# Patient Record
Sex: FEMALE | Race: WHITE | Employment: FULL TIME | URBAN - METROPOLITAN AREA
[De-identification: names, ages, dates, MRNs, and addresses within clinical notes are randomized per-mention and may not be internally consistent; named-entity substitution may affect disease eponyms.]

---

## 2018-11-05 PROBLEM — Z23 ENCOUNTER FOR IMMUNIZATION: Status: ACTIVE | Noted: 2018-11-05

## 2018-11-05 PROBLEM — Z34.90 PREGNANCY: Status: ACTIVE | Noted: 2018-11-05

## 2018-11-07 PROBLEM — Z67.91 RH NEGATIVE STATUS DURING PREGNANCY: Status: ACTIVE | Noted: 2018-11-07

## 2018-11-07 PROBLEM — O26.899 RH NEGATIVE STATUS DURING PREGNANCY: Status: ACTIVE | Noted: 2018-11-07

## 2018-11-07 PROBLEM — O09.899 MATERNAL VARICELLA, NON-IMMUNE: Status: ACTIVE | Noted: 2018-11-07

## 2018-11-07 PROBLEM — Z28.39 MATERNAL VARICELLA, NON-IMMUNE: Status: ACTIVE | Noted: 2018-11-07

## 2019-06-16 ENCOUNTER — ANESTHESIA EVENT (OUTPATIENT)
Dept: LABOR AND DELIVERY | Age: 29
DRG: 560 | End: 2019-06-16
Payer: COMMERCIAL

## 2019-06-16 ENCOUNTER — ANESTHESIA (OUTPATIENT)
Dept: LABOR AND DELIVERY | Age: 29
DRG: 560 | End: 2019-06-16
Payer: COMMERCIAL

## 2019-06-16 ENCOUNTER — HOSPITAL ENCOUNTER (INPATIENT)
Age: 29
LOS: 2 days | Discharge: HOME | DRG: 560 | End: 2019-06-18
Attending: OBSTETRICS & GYNECOLOGY | Admitting: OBSTETRICS & GYNECOLOGY
Payer: COMMERCIAL

## 2019-06-16 DIAGNOSIS — Z3A.40 40 WEEKS GESTATION OF PREGNANCY: Primary | ICD-10-CM

## 2019-06-16 PROBLEM — O42.00 PROM WITH ONSET OF LABOR WITHIN 24 HOURS OF RUPTURE: Status: ACTIVE | Noted: 2019-06-16

## 2019-06-16 LAB
ABO + RH BLD: NORMAL
ARTERIAL PATENCY WRIST A: ABNORMAL
BASE DEFICIT BLD-SCNC: 6 MMOL/L
BDY SITE: ABNORMAL
BLOOD BANK CMNT PATIENT-IMP: NORMAL
BLOOD GROUP ANTIBODIES SERPL: NORMAL
BODY TEMPERATURE: 98.6
CO2 BLD-SCNC: 27 MMOL/L
COLLECT TIME,HTIME: 1119
ERYTHROCYTE [DISTWIDTH] IN BLOOD BY AUTOMATED COUNT: 12.3 % (ref 11.9–14.6)
FETAL SCREEN,FMHS: NORMAL
GAS FLOW.O2 O2 DELIVERY SYS: ABNORMAL L/MIN
HCO3 BLD-SCNC: 24.8 MMOL/L (ref 22–26)
HCT VFR BLD AUTO: 35.6 % (ref 35.8–46.3)
HGB BLD-MCNC: 12.3 G/DL (ref 11.7–15.4)
MCH RBC QN AUTO: 31 PG (ref 26.1–32.9)
MCHC RBC AUTO-ENTMCNC: 34.6 G/DL (ref 31.4–35)
MCV RBC AUTO: 89.7 FL (ref 79.6–97.8)
NRBC # BLD: 0 K/UL (ref 0–0.2)
O2/TOTAL GAS SETTING VFR VENT: 21 %
PCO2 BLDCO: 73 MMHG (ref 32–68)
PH BLDCO: 7.14 [PH] (ref 7.15–7.38)
PLATELET # BLD AUTO: 231 K/UL (ref 150–450)
PMV BLD AUTO: 10.6 FL (ref 9.4–12.3)
PO2 BLDCO: 23 MMHG
RBC # BLD AUTO: 3.97 M/UL (ref 4.05–5.2)
SAO2 % BLD: 26 % (ref 95–98)
SERVICE CMNT-IMP: ABNORMAL
SPECIMEN EXP DATE BLD: NORMAL
SPECIMEN TYPE: ABNORMAL
WBC # BLD AUTO: 13.7 K/UL (ref 4.3–11.1)

## 2019-06-16 PROCEDURE — 76060000078 HC EPIDURAL ANESTHESIA

## 2019-06-16 PROCEDURE — 75410000003 HC RECOV DEL/VAG/CSECN EA 0.5 HR

## 2019-06-16 PROCEDURE — 85027 COMPLETE CBC AUTOMATED: CPT

## 2019-06-16 PROCEDURE — 74011250636 HC RX REV CODE- 250/636: Performed by: OBSTETRICS & GYNECOLOGY

## 2019-06-16 PROCEDURE — 36415 COLL VENOUS BLD VENIPUNCTURE: CPT

## 2019-06-16 PROCEDURE — 77030003028 HC SUT VCRL J&J -A

## 2019-06-16 PROCEDURE — 99283 EMERGENCY DEPT VISIT LOW MDM: CPT

## 2019-06-16 PROCEDURE — 85461 HEMOGLOBIN FETAL: CPT

## 2019-06-16 PROCEDURE — 59025 FETAL NON-STRESS TEST: CPT

## 2019-06-16 PROCEDURE — 77030014125 HC TY EPDRL BBMI -B: Performed by: ANESTHESIOLOGY

## 2019-06-16 PROCEDURE — 77030011943

## 2019-06-16 PROCEDURE — 74011250636 HC RX REV CODE- 250/636

## 2019-06-16 PROCEDURE — 0KQM0ZZ REPAIR PERINEUM MUSCLE, OPEN APPROACH: ICD-10-PCS | Performed by: OBSTETRICS & GYNECOLOGY

## 2019-06-16 PROCEDURE — 77030011945 HC CATH URIN INT ST MENT -A

## 2019-06-16 PROCEDURE — 74011000250 HC RX REV CODE- 250

## 2019-06-16 PROCEDURE — 75410000000 HC DELIVERY VAGINAL/SINGLE

## 2019-06-16 PROCEDURE — A4300 CATH IMPL VASC ACCESS PORTAL: HCPCS | Performed by: ANESTHESIOLOGY

## 2019-06-16 PROCEDURE — 86900 BLOOD TYPING SEROLOGIC ABO: CPT

## 2019-06-16 PROCEDURE — 74011250637 HC RX REV CODE- 250/637: Performed by: OBSTETRICS & GYNECOLOGY

## 2019-06-16 PROCEDURE — 82803 BLOOD GASES ANY COMBINATION: CPT

## 2019-06-16 PROCEDURE — 65270000029 HC RM PRIVATE

## 2019-06-16 PROCEDURE — 4A1HXCZ MONITORING OF PRODUCTS OF CONCEPTION, CARDIAC RATE, EXTERNAL APPROACH: ICD-10-PCS | Performed by: OBSTETRICS & GYNECOLOGY

## 2019-06-16 PROCEDURE — 77030018846 HC SOL IRR STRL H20 ICUM -A

## 2019-06-16 RX ORDER — ROPIVACAINE HYDROCHLORIDE 2 MG/ML
INJECTION, SOLUTION EPIDURAL; INFILTRATION; PERINEURAL AS NEEDED
Status: DISCONTINUED | OUTPATIENT
Start: 2019-06-16 | End: 2019-06-16

## 2019-06-16 RX ORDER — NALOXONE HYDROCHLORIDE 0.4 MG/ML
0.4 INJECTION, SOLUTION INTRAMUSCULAR; INTRAVENOUS; SUBCUTANEOUS AS NEEDED
Status: DISCONTINUED | OUTPATIENT
Start: 2019-06-16 | End: 2019-06-18 | Stop reason: HOSPADM

## 2019-06-16 RX ORDER — DOCUSATE SODIUM 100 MG/1
100 CAPSULE, LIQUID FILLED ORAL 2 TIMES DAILY
Status: DISCONTINUED | OUTPATIENT
Start: 2019-06-16 | End: 2019-06-18 | Stop reason: HOSPADM

## 2019-06-16 RX ORDER — BUTORPHANOL TARTRATE 1 MG/ML
1 INJECTION INTRAMUSCULAR; INTRAVENOUS
Status: DISCONTINUED | OUTPATIENT
Start: 2019-06-16 | End: 2019-06-16 | Stop reason: CLARIF

## 2019-06-16 RX ORDER — PROMETHAZINE HYDROCHLORIDE 25 MG/1
25 TABLET ORAL
Status: DISCONTINUED | OUTPATIENT
Start: 2019-06-16 | End: 2019-06-18 | Stop reason: HOSPADM

## 2019-06-16 RX ORDER — ROPIVACAINE HYDROCHLORIDE 2 MG/ML
INJECTION, SOLUTION EPIDURAL; INFILTRATION; PERINEURAL
Status: DISCONTINUED | OUTPATIENT
Start: 2019-06-16 | End: 2019-06-16 | Stop reason: HOSPADM

## 2019-06-16 RX ORDER — OXYTOCIN/RINGER'S LACTATE 30/500 ML
1-25 PLASTIC BAG, INJECTION (ML) INTRAVENOUS
Status: DISCONTINUED | OUTPATIENT
Start: 2019-06-16 | End: 2019-06-18 | Stop reason: ALTCHOICE

## 2019-06-16 RX ORDER — SIMETHICONE 80 MG
80 TABLET,CHEWABLE ORAL
Status: DISCONTINUED | OUTPATIENT
Start: 2019-06-16 | End: 2019-06-18 | Stop reason: HOSPADM

## 2019-06-16 RX ORDER — ZOLPIDEM TARTRATE 5 MG/1
5 TABLET ORAL
Status: DISCONTINUED | OUTPATIENT
Start: 2019-06-16 | End: 2019-06-18 | Stop reason: HOSPADM

## 2019-06-16 RX ORDER — MINERAL OIL
120 OIL (ML) ORAL
Status: COMPLETED | OUTPATIENT
Start: 2019-06-16 | End: 2019-06-16

## 2019-06-16 RX ORDER — OXYTOCIN/RINGER'S LACTATE 15/250 ML
250 PLASTIC BAG, INJECTION (ML) INTRAVENOUS ONCE
Status: ACTIVE | OUTPATIENT
Start: 2019-06-16 | End: 2019-06-16

## 2019-06-16 RX ORDER — IBUPROFEN 800 MG/1
800 TABLET ORAL
Status: DISCONTINUED | OUTPATIENT
Start: 2019-06-16 | End: 2019-06-18 | Stop reason: HOSPADM

## 2019-06-16 RX ORDER — OXYCODONE AND ACETAMINOPHEN 7.5; 325 MG/1; MG/1
1 TABLET ORAL
Status: DISCONTINUED | OUTPATIENT
Start: 2019-06-16 | End: 2019-06-18 | Stop reason: HOSPADM

## 2019-06-16 RX ORDER — FENTANYL CITRATE 50 UG/ML
INJECTION, SOLUTION INTRAMUSCULAR; INTRAVENOUS AS NEEDED
Status: DISCONTINUED | OUTPATIENT
Start: 2019-06-16 | End: 2019-06-16 | Stop reason: HOSPADM

## 2019-06-16 RX ORDER — LIDOCAINE HYDROCHLORIDE 20 MG/ML
JELLY TOPICAL
Status: DISCONTINUED | OUTPATIENT
Start: 2019-06-16 | End: 2019-06-16 | Stop reason: HOSPADM

## 2019-06-16 RX ORDER — BUTORPHANOL TARTRATE 2 MG/ML
1 INJECTION INTRAMUSCULAR; INTRAVENOUS
Status: DISCONTINUED | OUTPATIENT
Start: 2019-06-16 | End: 2019-06-16 | Stop reason: HOSPADM

## 2019-06-16 RX ORDER — ONDANSETRON 2 MG/ML
4 INJECTION INTRAMUSCULAR; INTRAVENOUS
Status: DISCONTINUED | OUTPATIENT
Start: 2019-06-16 | End: 2019-06-18 | Stop reason: ALTCHOICE

## 2019-06-16 RX ORDER — SODIUM CHLORIDE 0.9 % (FLUSH) 0.9 %
5-40 SYRINGE (ML) INJECTION AS NEEDED
Status: DISCONTINUED | OUTPATIENT
Start: 2019-06-16 | End: 2019-06-16 | Stop reason: HOSPADM

## 2019-06-16 RX ORDER — LIDOCAINE HYDROCHLORIDE 10 MG/ML
1 INJECTION INFILTRATION; PERINEURAL
Status: COMPLETED | OUTPATIENT
Start: 2019-06-16 | End: 2019-06-16

## 2019-06-16 RX ORDER — LIDOCAINE HYDROCHLORIDE AND EPINEPHRINE 15; 5 MG/ML; UG/ML
INJECTION, SOLUTION EPIDURAL
Status: COMPLETED | OUTPATIENT
Start: 2019-06-16 | End: 2019-06-16

## 2019-06-16 RX ORDER — ACETAMINOPHEN 500 MG
500 TABLET ORAL
COMMUNITY
End: 2020-12-09

## 2019-06-16 RX ORDER — DEXTROSE, SODIUM CHLORIDE, SODIUM LACTATE, POTASSIUM CHLORIDE, AND CALCIUM CHLORIDE 5; .6; .31; .03; .02 G/100ML; G/100ML; G/100ML; G/100ML; G/100ML
125 INJECTION, SOLUTION INTRAVENOUS CONTINUOUS
Status: DISCONTINUED | OUTPATIENT
Start: 2019-06-16 | End: 2019-06-16 | Stop reason: HOSPADM

## 2019-06-16 RX ORDER — DIPHENHYDRAMINE HCL 25 MG
25 CAPSULE ORAL
Status: DISCONTINUED | OUTPATIENT
Start: 2019-06-16 | End: 2019-06-18 | Stop reason: HOSPADM

## 2019-06-16 RX ORDER — MINERAL OIL
120 OIL (ML) ORAL AS NEEDED
Status: DISCONTINUED | OUTPATIENT
Start: 2019-06-16 | End: 2019-06-18 | Stop reason: ALTCHOICE

## 2019-06-16 RX ORDER — ONDANSETRON 2 MG/ML
INJECTION INTRAMUSCULAR; INTRAVENOUS
Status: ACTIVE
Start: 2019-06-16 | End: 2019-06-16

## 2019-06-16 RX ORDER — SODIUM CHLORIDE 0.9 % (FLUSH) 0.9 %
5-40 SYRINGE (ML) INJECTION EVERY 8 HOURS
Status: DISCONTINUED | OUTPATIENT
Start: 2019-06-16 | End: 2019-06-16 | Stop reason: HOSPADM

## 2019-06-16 RX ADMIN — SODIUM CHLORIDE, SODIUM LACTATE, POTASSIUM CHLORIDE, CALCIUM CHLORIDE, AND DEXTROSE MONOHYDRATE 125 ML/HR: 600; 310; 30; 20; 5 INJECTION, SOLUTION INTRAVENOUS at 03:50

## 2019-06-16 RX ADMIN — Medication 1 SPRAY: at 16:16

## 2019-06-16 RX ADMIN — OXYTOCIN 2 MILLI-UNITS/MIN: 10 INJECTION, SOLUTION INTRAMUSCULAR; INTRAVENOUS at 06:04

## 2019-06-16 RX ADMIN — SODIUM CHLORIDE, SODIUM LACTATE, POTASSIUM CHLORIDE, AND CALCIUM CHLORIDE 500 ML: 600; 310; 30; 20 INJECTION, SOLUTION INTRAVENOUS at 10:12

## 2019-06-16 RX ADMIN — OXYCODONE HYDROCHLORIDE AND ACETAMINOPHEN 1 TABLET: 7.5; 325 TABLET ORAL at 13:48

## 2019-06-16 RX ADMIN — WITCH HAZEL 1 PAD: 500 SOLUTION RECTAL; TOPICAL at 15:15

## 2019-06-16 RX ADMIN — DOCUSATE SODIUM 100 MG: 100 CAPSULE, LIQUID FILLED ORAL at 16:16

## 2019-06-16 RX ADMIN — ROPIVACAINE HYDROCHLORIDE 9 ML/HR: 2 INJECTION, SOLUTION EPIDURAL; INFILTRATION; PERINEURAL at 03:47

## 2019-06-16 RX ADMIN — OXYTOCIN 4 MILLI-UNITS/MIN: 10 INJECTION, SOLUTION INTRAMUSCULAR; INTRAVENOUS at 07:02

## 2019-06-16 RX ADMIN — OXYCODONE HYDROCHLORIDE AND ACETAMINOPHEN 1 TABLET: 7.5; 325 TABLET ORAL at 22:09

## 2019-06-16 RX ADMIN — IBUPROFEN 800 MG: 800 TABLET, FILM COATED ORAL at 13:08

## 2019-06-16 RX ADMIN — IBUPROFEN 800 MG: 800 TABLET, FILM COATED ORAL at 19:51

## 2019-06-16 RX ADMIN — OXYCODONE HYDROCHLORIDE AND ACETAMINOPHEN 1 TABLET: 7.5; 325 TABLET ORAL at 17:37

## 2019-06-16 RX ADMIN — LIDOCAINE HYDROCHLORIDE 0.1 ML: 10 INJECTION, SOLUTION INFILTRATION; PERINEURAL at 11:38

## 2019-06-16 RX ADMIN — ONDANSETRON 4 MG: 2 INJECTION INTRAMUSCULAR; INTRAVENOUS at 02:15

## 2019-06-16 RX ADMIN — LIDOCAINE HYDROCHLORIDE AND EPINEPHRINE 3.5 ML: 15; 5 INJECTION, SOLUTION EPIDURAL at 03:42

## 2019-06-16 RX ADMIN — OXYTOCIN 250 MILLI-UNITS/MIN: 10 INJECTION, SOLUTION INTRAMUSCULAR; INTRAVENOUS at 11:42

## 2019-06-16 RX ADMIN — FENTANYL CITRATE 100 MCG: 50 INJECTION, SOLUTION INTRAMUSCULAR; INTRAVENOUS at 08:36

## 2019-06-16 RX ADMIN — MINERAL OIL 120 ML: 471.95 OIL ORAL at 10:12

## 2019-06-16 NOTE — PROGRESS NOTES
Pt c/o urge to void. Pt up and ambulated to bathroom with assistance from this RN and MAMADOU Perdomo RN. Pt able to void 540ml, no clots noted in toilet, christiano care taught and pt able to perform. Christiano pad changed, mesh underwear changed. Ice pack applied to perineum. Upon standing up from toilet, pt c/o feeling lightheaded, amnioma pack opened, pt alert, talking, pink. Pt able to walk back to bed, HOB lowered. Pt in bed with call light in reach, no complaints at this time.

## 2019-06-16 NOTE — H&P
History & Physical    Name: Beata Reyes MRN: [id number]  SSN: [id number]    YOB: 1990  Age: 34 y.o. Sex: female      Chief c/o: srom  Subjective:     Estimated Date of Delivery: 19  OB History    Para Term  AB Living   1 0 0 0 0 0   SAB TAB Ectopic Molar Multiple Live Births   0 0 0   0        # Outcome Date GA Lbr Harry/2nd Weight Sex Delivery Anes PTL Lv   1 Current                Ms. Carlos Patterson is admitted with pregnancy at [address] [place] [address] for prom. Prenatal course was complicated by marginal cord insertion. Please see prenatal records for details. No past medical history on file. No past surgical history on file. Social History     Occupational History    Not on file   Tobacco Use    Smoking status: Never Smoker    Smokeless tobacco: Never Used   Substance and Sexual Activity    Alcohol use: No    Drug use: No    Sexual activity: Yes     Partners: Male     Family History   Problem Relation Age of Onset    No Known Problems Mother     No Known Problems Father        Allergies   Allergen Reactions    Sulfa (Sulfonamide Antibiotics) Unknown (comments)     As a child     Prior to Admission medications    Medication Sig Start Date End Date Taking? Authorizing Provider   acetaminophen (TYLENOL EXTRA STRENGTH) 500 mg tablet Take 500 mg by mouth every six (6) hours as needed for Pain. Yes Provider, Historical   raNITIdine (ZANTAC) 150 mg tablet Take 150 mg by mouth two (2) times a day. Yes Provider, Historical   [id number] nadeem-folic-dha (PRENATAL DHA+COMPLETE PRENATAL) [id number] mg-mcg-mg cmpk Take  by mouth. Yes Provider, Historical        Review of Systems: A comprehensive review of systems was negative except for that written in the HPI.     Objective:     Vitals:  Vitals:    19 0017 19 0019   BP:  117/85   Pulse:  95   Resp:  16   Temp:  97.8 °F (36.6 °C)   Weight: 86.2 kg (190 lb)    Height: 5' 5\" (1.651 m)         Physical Exam:  Patient without distress. Heart: Regular rate and rhythm  Lung: clear to auscultation throughout lung fields, no wheezes, no rales, no rhonchi and normal respiratory effort  Back: costovertebral angle tenderness absent  Abdomen: soft, nontender  Fundus: soft and non tender  Perineum: blood absent, amniotic fluid present  Cervical Exam: 3 cm dilated    90% effaced    0 station    Presenting Part: cephalic  Lower Extremities:  - Edema 1+   - Patellar Reflexes: 2+ bilaterally  Membranes:  Spontaneous Rupture of Membranes; Amniotic Fluid: thin meconium fluid  Fetal Heart Rate: Reactive    Prenatal Labs:   Lab Results   Component Value Date/Time    Rubella, External immune 11/05/2018    GrBStrep, External negative 05/17/2019    HBsAg, External negative 11/05/2018    HIV, External NR 11/05/2018    RPR, External NR 11/05/2018    Gonorrhea, External negative 12/03/2018    Chlamydia, External negative 12/03/2018    ABO,Rh AB negative 11/05/2018         Assessment/Plan:     Active Problems:    PROM with onset of labor within 24 hours of rupture (6/16/2019)         Plan:29 yo G1 at [address] [place] [address] for PROM. Light meconium. Mild contractions. Group B Strep was negative. Rh - neg. Having mild contractions. Start pitocin if no contractions by 6:00.

## 2019-06-16 NOTE — PROGRESS NOTES
Dr. Ang Olivares @ bedside. E 3/90/0. Bedside US performed to verify cephalic position. Pt. Grossly ruptures with thin meconium stained fluid. Will admit to room 428 for SAMUEL. 0115 IV started and blood drawn and sent to lab. Consents reviewed and POC discussed. Pt voiced understanding and denies questions at this time.

## 2019-06-16 NOTE — ANESTHESIA PREPROCEDURE EVALUATION
Relevant Problems   No relevant active problems       Anesthetic History   No history of anesthetic complications            Review of Systems / Medical History  Patient summary reviewed, nursing notes reviewed and pertinent labs reviewed    Pulmonary  Within defined limits                 Neuro/Psych   Within defined limits           Cardiovascular                  Exercise tolerance: >4 METS     GI/Hepatic/Renal     GERD           Endo/Other  Within defined limits           Other Findings              Physical Exam    Airway  Mallampati: II  TM Distance: 4 - 6 cm  Neck ROM: normal range of motion   Mouth opening: Normal     Cardiovascular    Rhythm: regular           Dental  No notable dental hx       Pulmonary  Breath sounds clear to auscultation               Abdominal  GI exam deferred       Other Findings            Anesthetic Plan    ASA: 2  Anesthesia type: epidural          Induction: Intravenous  Anesthetic plan and risks discussed with: Patient

## 2019-06-16 NOTE — PROCEDURES
Delivery Note    Patient Name: Viral Matos  MRN: [id number]    Obstetrician:  Marko Hines MD    Assistant: none    Pre-Delivery Diagnosis: Term pregnancy, Spontaneous labor, Single fetus and Uncomplicated pregnancy    Post-Delivery Diagnosis: Female    Intrapartum Event: Meconium    Procedure: Spontaneous vaginal delivery    Epidural: YES    Monitor:  Fetal Heart Tones - External and Uterine Contractions - External    Indications for instrumental delivery: none    Estimated Blood Loss: 300    Episiotomy: 2nd degree    Laceration(s):  2nd degree, large extension of small epis, almost a 3rd.     Laceration(s) repair: YES    Presentation: Cephalic    Fetal Description: del castillo    Fetal Position: Left Occiput Anterior    Birth Weight: 7-1    Birth Length: 51    Apgar - One Minute: 9    Apgar - Five Minutes: 9    Umbilical Cord: 3 vessels present and Cord blood sent to lab for type, Rh, and Ad' test    Specimens: no           Complications:  none           No components found for: OBEXTABO/RH,  OBEXTANTIBODY,  OBEXTRUBELLA,  OBEXTGRBS         Attending Attestation: I was present and scrubbed for the entire procedure

## 2019-06-16 NOTE — ANESTHESIA PROCEDURE NOTES
Epidural Block    Start time: 6/16/2019 3:30 AM  End time: 6/16/2019 3:45 AM  Performed by: Trini Denise MD  Authorized by: Trini Denise MD     Pre-Procedure  Indication: labor epidural    Preanesthetic Checklist: patient identified, risks and benefits discussed, anesthesia consent, site marked, patient being monitored, timeout performed and anesthesia consent    Timeout Time: 03:35        Epidural:   Patient position:  Seated  Prep: Chlorhexidine    Location:  L3-4    Needle and Epidural Catheter:   Needle Type:  Tuohy  Needle Gauge:  17 G  Injection Technique:  Loss of resistance using air  Attempts:  1  Catheter Size:  19 G  Catheter at Skin Depth (cm):  12  Depth in Epidural Space (cm):  6  Events: no blood with aspiration, no cerebrospinal fluid with aspiration, no paresthesia and negative aspiration test    Test Dose:  Negative    Assessment:   Catheter Secured:  Tegaderm and tape  Insertion:  Uncomplicated  Patient tolerance:  Patient tolerated the procedure well with no immediate complications

## 2019-06-16 NOTE — PROGRESS NOTES
Pt up and ambulated to bathroom with no complaints. Pt able to void again. No clots noted in toilet. Alessia care performed by pt. Alessia pad changed. Pt back in bed with call light in reach. Pt tolerated ambulation well.

## 2019-06-16 NOTE — PROGRESS NOTES
SBAR OUT Report: Mother    Verbal report given to Vijay Walls RN  (full name & credentials) on this patient, who is now being transferred to MIU (unit) for routine progression of care. The patient is not wearing a green \"Anesthesia-Duramorph\" band. Report consisted of patient's Situation, Background, Assessment and Recommendations (SBAR). [place] ID bands were compared with the identification form, and verified with the patient and receiving nurse. Information from the SBAR and the [address] [facility] Report was reviewed with the receiving nurse; opportunity for questions and clarification provided.

## 2019-06-16 NOTE — PROGRESS NOTES
Pt in room 428. SBAR given to Ra Villanueva RN. Pt care relinquished. Pt not hooked up to EFM. Requests wireless monitoring. Raul Man is placing [place] wireless monitor at this time.

## 2019-06-16 NOTE — PROGRESS NOTES
Upon entering room pt sitting on side of bed crying with FOB (Wing Caller) at bedside. Pt was talking on the phone. Once off the phone this RN expressed concern for pt, pt states she was \"tired and needs to rest\". Pt states \"I need time with my new baby\". Pt states FOB made plans for his family to come meet the baby tonight and she doesn't want anymore visitors at this time. Pt states she feels bad for not letting his family come when she let her mother, father and sister come. This RN sat on bed with pt and listened and explained to pt that she does need to rest to help with her recovery and to help take care of baby. FOB states he will call family and ask them not to come. Both FOB and pt crying. This RN stayed at bedside until both were no longer crying. Upon leaving the room pt in bed holding baby, call light in reach. Social service consult ordered to follow up with pt regarding support system.

## 2019-06-16 NOTE — PROGRESS NOTES
Shift assessment complete. See flowsheet for details. POC reviewed with pt and pt verbalized understanding. Pt oriented to room and has call light within reach. Pt denies any needs at this time but will call out PRN. Pt now resting in bed with  at bedside. Encouraged pt to get some rest, will continue to monitor.

## 2019-06-16 NOTE — PROGRESS NOTES
Pt in bed crying, FOB at bedside holding pt hand. Pt denies needs at this time. Call light in reach.

## 2019-06-16 NOTE — LACTATION NOTE
This note was copied from a baby's chart. Mom reports baby attempted at delivery. Assisted with attempt in laid back on L and football on R. No latch. Baby uninterested and gaggy. She really did not try to latch. Reviewed first 24 hour expectations. Discussed feeding expectations in second day. Encouraged to try at breast.  Reviewed Breastfeeding Packet. Put baby skin to skin and encouraged to try again at breast.  Plan to assist with feeding prior to discharge. Encouraged skin to skin. Discussed if baby still struggling to latch by am, may need to pump.

## 2019-06-16 NOTE — PROGRESS NOTES
SBAR IN Report: Mother    Verbal report received from Konstantin Doan RN (full name & credentials) on this patient, who is now being transferred from L&D (unit) for routine progression of care. The patient is not wearing a green \"Anesthesia-Duramorph\" band. Report consisted of patient's Situation, Background, Assessment and Recommendations (SBAR).  ID bands were compared with the identification form, and verified with the patient and transferring nurse. Information from the SBAR, Procedure Summary, Intake/Output, MAR and Recent Results and the [place] Report was reviewed with the transferring nurse; opportunity for questions and clarification provided.

## 2019-06-16 NOTE — PROGRESS NOTES
Pt reports to [facility] with c/o SROM @ 2320 06/15/2019. Pt reports a gush of clear fluid. TOCO and US applied to soft non-tender abdomen. Triage database complete.  Dr. Kirstie Barnett notified of pt arrival.

## 2019-06-16 NOTE — LACTATION NOTE

## 2019-06-16 NOTE — PROGRESS NOTES
Admission assessment complete see flowsheet. Pt oriented to room and call light. Pt having some numbness/tingling in LLE, pt able to wiggle toes. Pt instructed to call for assistance before getting OOB. Discussed today plan of care with pt. Pt voiced understanding. Pt to call with needs/concerns. Pt in bed eating upon this RN leaving the room. Call light in reach, family at bedside, water pitcher filled.

## 2019-06-17 LAB
ARTERIAL PATENCY WRIST A: ABNORMAL
BASE DEFICIT BLD-SCNC: 6 MMOL/L
BDY SITE: ABNORMAL
BODY TEMPERATURE: 98.6
CO2 BLD-SCNC: 23 MMOL/L
COLLECT TIME,HTIME: 1119
GAS FLOW.O2 O2 DELIVERY SYS: ABNORMAL L/MIN
HCO3 BLDV-SCNC: 21.2 MMOL/L (ref 23–28)
O2/TOTAL GAS SETTING VFR VENT: 21 %
PCO2 BLDCO: 48 MMHG (ref 32–68)
PH BLDCO: 7.26 [PH] (ref 7.15–7.38)
PO2 BLDCO: 29 MMHG
SAO2 % BLDV: 46 % (ref 65–88)
SERVICE CMNT-IMP: ABNORMAL
SPECIMEN TYPE: ABNORMAL

## 2019-06-17 PROCEDURE — 74011250637 HC RX REV CODE- 250/637: Performed by: OBSTETRICS & GYNECOLOGY

## 2019-06-17 PROCEDURE — 74011250636 HC RX REV CODE- 250/636: Performed by: OBSTETRICS & GYNECOLOGY

## 2019-06-17 PROCEDURE — 65270000029 HC RM PRIVATE

## 2019-06-17 RX ADMIN — OXYCODONE HYDROCHLORIDE AND ACETAMINOPHEN 1 TABLET: 7.5; 325 TABLET ORAL at 10:45

## 2019-06-17 RX ADMIN — SIMETHICONE CHEW TAB 80 MG 80 MG: 80 TABLET ORAL at 08:36

## 2019-06-17 RX ADMIN — IBUPROFEN 800 MG: 800 TABLET, FILM COATED ORAL at 20:32

## 2019-06-17 RX ADMIN — OXYCODONE HYDROCHLORIDE AND ACETAMINOPHEN 1 TABLET: 7.5; 325 TABLET ORAL at 18:30

## 2019-06-17 RX ADMIN — OXYCODONE HYDROCHLORIDE AND ACETAMINOPHEN 1 TABLET: 7.5; 325 TABLET ORAL at 14:22

## 2019-06-17 RX ADMIN — RHO(D) IMMUNE GLOBULIN (HUMAN) 0.3 MG: 1500 SOLUTION INTRAMUSCULAR at 17:34

## 2019-06-17 RX ADMIN — IBUPROFEN 800 MG: 800 TABLET, FILM COATED ORAL at 08:36

## 2019-06-17 RX ADMIN — OXYCODONE HYDROCHLORIDE AND ACETAMINOPHEN 1 TABLET: 7.5; 325 TABLET ORAL at 22:24

## 2019-06-17 RX ADMIN — OXYCODONE HYDROCHLORIDE AND ACETAMINOPHEN 1 TABLET: 7.5; 325 TABLET ORAL at 05:12

## 2019-06-17 RX ADMIN — OXYCODONE HYDROCHLORIDE AND ACETAMINOPHEN 1 TABLET: 7.5; 325 TABLET ORAL at 01:39

## 2019-06-17 RX ADMIN — DOCUSATE SODIUM 100 MG: 100 CAPSULE, LIQUID FILLED ORAL at 08:36

## 2019-06-17 RX ADMIN — IBUPROFEN 800 MG: 800 TABLET, FILM COATED ORAL at 01:39

## 2019-06-17 RX ADMIN — DOCUSATE SODIUM 100 MG: 100 CAPSULE, LIQUID FILLED ORAL at 18:30

## 2019-06-17 RX ADMIN — IBUPROFEN 800 MG: 800 TABLET, FILM COATED ORAL at 14:22

## 2019-06-17 NOTE — PROGRESS NOTES
Shift assessment complete see flowsheet. Discussed today plan of care with pt. Encouraged pt to ambulate in hallway today. Motrin given for pain, see MAR, too soon to given Percocet and pt would like to eat breakfast before taking percocet. Mylicon given for c/o indigestion. Pt in bed with call light in reach.

## 2019-06-17 NOTE — PROGRESS NOTES
Post-Partum Day Number 1 Progress Note    Patient doing well post-partum without significant complaint. Voiding without difficulty, normal lochia. Vitals:    Patient Vitals for the past 8 hrs:   BP Temp Pulse Resp SpO2   19 0648 109/58 98.2 °F (36.8 °C) 92 17 96 %     Temp (24hrs), Av.3 °F (36.8 °C), Min:98 °F (36.7 °C), Max:98.5 °F (36.9 °C)      Vital signs stable, afebrile. Exam:  Patient without distress. Abdomen soft, fundus firm at level of umbilicus, nontender               Perineum with normal lochia noted. Lower extremities are negative for swelling, cords or tenderness. Lab/Data Review: All lab results for the last 24 hours reviewed. Assessment and Plan:  Patient appears to be having uncomplicated post-partum course. Continue routine perineal care and maternal education. Plan discharge tomorrow if no problems occur.

## 2019-06-17 NOTE — LACTATION NOTE
This note was copied from a baby's chart. Baby finished feeding on left breast, encouraged mother to go ahead and pump both breast especially the right due to the fact baby is only latching on left side and to give back what she pumps. Mother voiced understanding.

## 2019-06-17 NOTE — LACTATION NOTE
This note was copied from a baby's chart. Due to the fact that baby would not latch at last feeding this RN offered to get mother pumping. Mother declines to pump at this time and would like for her visitors to see baby quickly and then she may want to pump.

## 2019-06-17 NOTE — LACTATION NOTE

## 2019-06-17 NOTE — PROGRESS NOTES
Care Management Interventions  Current Support Network: Lives with Spouse  Discharge Location  Discharge Placement: Home  SW Consult \"See note from 6/16/19 at [phone] and 1818 and first-time parent\". Attempted to see patient on several occasions however could not as sign on door \"Do Not Disturb, Mother and baby sleeping\". Will leave note for SW tomorrow to attempt again.

## 2019-06-17 NOTE — LACTATION NOTE
In to see mom and infant for the first time. Mom stated that infant has latched and nursed but most of the feeds have just been attempts. Offered to assist with a feeding. Placed infant to mom's left breast in the football hold as well as the cross cradle. Infant latched and would take a few sucks and come off the breast. After several attempts I suggested to mom to initiate pumping. She agreed and I brought a breast pump into the room and instructed her on how to use the pump and the breast pump kit. Also informed mom that the kit was hers to take home. Mom pumped and expressed drops. Informed mom that this was normal and she feed that to infant on her finger. Instructed mom to offer breast, then pump and feed expressed colostrum to infant. Reviewed second night of life. Lactation consultant will follow up tomorrow.

## 2019-06-18 VITALS
HEIGHT: 65 IN | WEIGHT: 190 LBS | DIASTOLIC BLOOD PRESSURE: 66 MMHG | RESPIRATION RATE: 16 BRPM | TEMPERATURE: 98.5 F | HEART RATE: 74 BPM | SYSTOLIC BLOOD PRESSURE: 114 MMHG | BODY MASS INDEX: 31.65 KG/M2 | OXYGEN SATURATION: 95 %

## 2019-06-18 PROCEDURE — 74011250637 HC RX REV CODE- 250/637: Performed by: OBSTETRICS & GYNECOLOGY

## 2019-06-18 RX ORDER — IBUPROFEN 800 MG/1
800 TABLET ORAL
Qty: 30 TAB | Refills: 0 | Status: SHIPPED | OUTPATIENT
Start: 2019-06-18 | End: 2020-01-02

## 2019-06-18 RX ORDER — OXYCODONE AND ACETAMINOPHEN 7.5; 325 MG/1; MG/1
1 TABLET ORAL
Qty: 12 TAB | Refills: 0 | Status: SHIPPED | OUTPATIENT
Start: 2019-06-18 | End: 2019-06-21 | Stop reason: ALTCHOICE

## 2019-06-18 RX ADMIN — OXYCODONE HYDROCHLORIDE AND ACETAMINOPHEN 1 TABLET: 7.5; 325 TABLET ORAL at 05:56

## 2019-06-18 RX ADMIN — IBUPROFEN 800 MG: 800 TABLET, FILM COATED ORAL at 08:32

## 2019-06-18 RX ADMIN — OXYCODONE HYDROCHLORIDE AND ACETAMINOPHEN 1 TABLET: 7.5; 325 TABLET ORAL at 02:24

## 2019-06-18 RX ADMIN — DOCUSATE SODIUM 100 MG: 100 CAPSULE, LIQUID FILLED ORAL at 08:31

## 2019-06-18 RX ADMIN — IBUPROFEN 800 MG: 800 TABLET, FILM COATED ORAL at 02:24

## 2019-06-18 NOTE — LACTATION NOTE
This note was copied from a baby's chart. In to see mom and infant for discharge. Mom doing skin to skin w/ baby at this time. She states baby is starting to latch and feed a little longer- did 20 minutes on one side this am. Mom pumped other side. Mom feels baby is latching well, but many feeds very short (10 min only). Encouraged her to keep putting baby to breast first, offer both sides. Pump behind any poor/fair feeds or less than 15-20 minutes, and give back colostrum today. Also printed feeding plan for further guidance as needed at home. Mom [facility] tomorrow for follow up. Reviewed discharge info and how to manage period of engorgement. No further needs.

## 2019-06-18 NOTE — ANESTHESIA POSTPROCEDURE EVALUATION
* No procedures listed *.    epidural    Anesthesia Post Evaluation      Multimodal analgesia: multimodal analgesia used between 6 hours prior to anesthesia start to PACU discharge  Patient location during evaluation: bedside  Patient participation: complete - patient participated  Level of consciousness: awake and alert  Pain score: 3  Pain management: adequate  Airway patency: patent  Anesthetic complications: no  Cardiovascular status: acceptable and hemodynamically stable  Respiratory status: acceptable  Hydration status: acceptable  Post anesthesia nausea and vomiting:  none      No vitals data found for the desired time range.

## 2019-06-18 NOTE — PROGRESS NOTES
Discharge instructions completed. Patient voiced understanding. Prescription given.   Patient waiting on hearing screen for baby

## 2019-06-18 NOTE — DISCHARGE SUMMARY
Post-Partum Day Number 2 Progress/Discharge Note    Patient doing well post-partum without significant complaint. Voiding without difficulty, normal lochia, positive flatus. Vitals:    Patient Vitals for the past 8 hrs:   BP Temp Pulse Resp SpO2   19 0725 114/66 98.5 °F (36.9 °C) 74 16 95 %     Temp (24hrs), Av.2 °F (36.8 °C), Min:97.8 °F (36.6 °C), Max:98.5 °F (36.9 °C)      Vital signs stable, afebrile. Exam:  Patient without distress. Abdomen soft, fundus firm at level of umbilicus, non tender               Perineum with normal lochia noted. Lower extremities are negative for swelling, cords or tenderness. Lab/Data Review: All lab results for the last 24 hours reviewed. Assessment and Plan:  Patient appears to be having uncomplicated post-partum course. Continue routine perineal care and maternal education. Plan discharge for today with follow up in our office in 2 weeks.

## 2019-06-18 NOTE — LACTATION NOTE
This note was copied from a baby's chart. Individualized Feeding Plan for Breastfeeding   Lactation Services [phone]      As much as possible, hold your baby on your chest so babys bare skin is against your bare skin with a blanket covering babys back, especially 30 minutes before it is time for baby to eat. Watch for early feeding cues such as, licking lips, sucking motions, rooting, hands to mouth. Crying is a late feeding cue. Feed your baby at least 8 times in 24 hours, or more if your baby is showing feeding cues. If baby is sleepy put baby skin to skin and watch for hunger cues. To rouse baby: unwrap, undress, massage hands, feet, & back, change diaper, gently change babys position from lying to sitting. 15-20 minutes on the first breast of active breastfeeding is considered a good feeding. Good, active breastfeeding is when baby is alert, tugging the nipple, their ear may move, and you can hear swallows. Allow baby to finish the first side before changing sides. Sleeping at the breast or only brief, light sucks should not be considered a good, full breastfeed. At each feeding:  __x__1. Do Suck Practice on finger before each feeding until sucking pattern is smooth. Try using index finger. Nail down towards tongue. __x__2. Hand Express for a few minutes prior to latching to help start milk flow. __x__3. Baby needs to NURSE WELL x 15-20 minutes on at least first breast, burp and offer 2nd breast at every feeding. If no sustained latch only attempt at breast for 10 minutes. If baby does not latch on and feed well on at least one side, you should:   __x__4. Double pump for 15 minutes with breast massage and compression. Hand express for an additional 2-3 minutes per side. Pump after each feeding attempt or poor feeding, up to 8 times per day. If you are not putting baby to the breast you need to pump 8 times a day. Pump every 3 hours. __x__5.  Give baby all of the breast milk you obtain using a straight syringe or  curved syringe. If baby does NOT have enough wet and dirty diapers per day, is jaundiced/lethargic, or has significant weight loss AND you do NOT pump enough milk for each feeding (per volume listed below), formula supplementation may need to be used. Call lactation department /pediatrician if you have concerns. AVERAGE INTAKES OF COLOSTRUM BY HEALTHY  INFANTS:  Time  Day Intake (ml/feed)  Based on 8 feedings per day. 1st 24 hrs  1 2-10 ml  24-48 hrs  2 5-15 ml  48-72 hrs  3 15-30 ml (0.5-1 oz)  72-96 hrs  4 30-45 ml (1-1.5oz)                          5-6      45-60 ml (1.5-2oz)                           7         60-75ml (2.2.5oz)    By day 7, baby will need 70 ml or 2.3 oz at each feeding based on 8 feedings per day & babys weight. (1oz = 30ml). Total milk volume needed in 24 hours by Day 7 is 19 oz per day based on baby's birthweight of 7lb 1oz. The more often baby eats, the less volume they need per feeding. If baby is eating more often than the minimum of 8 times per day, they may take less per feeding. Comments:     Use feeding plan until follow up with pediatrician. Continue to attempt at the breast for most feeds. Pump every 3 hours if no latch. Give all pumped colostrum/breastmilk at each feeding. OUTPATIENT APPOINTMENT SCHEDULED FOR :      Outpatient services are located on the 4th floor at [facility]. Check in at the 4th floor registration desk (the same one you used when you came to have your baby).   Call for questions [phone]

## 2019-06-18 NOTE — LACTATION NOTE

## 2019-06-18 NOTE — DISCHARGE INSTRUCTIONS
Patient Education        After Your Delivery (the Postpartum Period): Care Instructions  Your Care Instructions    Congratulations on the birth of your baby. Like pregnancy, the  period can be a time of excitement, lan, and exhaustion. You may look at your wondrous little baby and feel happy. You may also be overwhelmed by your new sleep hours and new responsibilities. At first, babies often sleep during the days and are awake at night. They do not have a pattern or routine. They may make sudden gasps, jerk themselves awake, or look like they have crossed eyes. These are all normal, and they may even make you smile. In these first weeks after delivery, try to take good care of yourself. It may take 4 to 6 weeks to feel like yourself again, and possibly longer if you had a  birth. You will likely feel very tired for several weeks. Your days will be full of ups and downs, but lots of lan as well. Follow-up care is a key part of your treatment and safety. Be sure to make and go to all appointments, and call your doctor if you are having problems. It's also a good idea to know your test results and keep a list of the medicines you take. How can you care for yourself at home? Take care of your body after delivery  · Use pads instead of tampons for the bloody flow that may last as long as 2 weeks. · Ease cramps with ibuprofen (Advil, Motrin). · Ease soreness of hemorrhoids and the area between your vagina and rectum with ice compresses or witch hazel pads. · Ease constipation by drinking lots of fluid and eating high-fiber foods. Ask your doctor about over-the-counter stool softeners. · Cleanse yourself with a gentle squeeze of warm water from a bottle instead of wiping with toilet paper. · Take a sitz bath in warm water several times a day. · Wear a good nursing bra. Ease sore and swollen breasts with warm, wet washcloths.   · If you are not breastfeeding, use ice rather than heat for breast soreness. · Your period may not start for several months if you are breastfeeding. You may bleed more, and longer at first, than you did before you got pregnant. · Wait until you are healed (about 4 to 6 weeks) before you have sexual intercourse. Your doctor will tell you when it is okay to have sex. · Try not to travel with your baby for 5 or 6 weeks. If you take a long car trip, make frequent stops to walk around and stretch. Avoid exhaustion  · Rest every day. Try to nap when your baby naps. · Ask another adult to be with you for a few days after delivery. · Plan for  if you have other children. · Stay flexible so you can eat at odd hours and sleep when you need to. Both you and your baby are making new schedules. · Plan small trips to get out of the house. Change can make you feel less tired. · Ask for help with housework, cooking, and shopping. Remind yourself that your job is to care for your baby. Know about help for postpartum depression  · \"Baby blues\" are common for the first 1 to 2 weeks after birth. You may cry or feel sad or irritable for no reason. · Rest whenever you can. Being tired makes it harder to handle your emotions. · Go for walks with your baby. · Talk to your partner, friends, and family about your feelings. · If your symptoms last for more than a few weeks, or if you feel very depressed, ask your doctor for help. · Postpartum depression can be treated. Support groups and counseling can help. Sometimes medicine can also help. Stay healthy  · Eat healthy foods so you have more energy and lose extra baby pounds. · If you breastfeed, avoid drugs. If you quit smoking during pregnancy, try to stay smoke-free. If you choose to have a drink now and then, have only one drink, and limit the number of occasions that you have a drink. Wait to breastfeed at least 2 hours after you have a drink to reduce the amount of alcohol the baby may get in the milk.   · Start daily exercise after 4 to 6 weeks, but rest when you feel tired. · Learn exercises to tone your belly. Do Kegel exercises to regain strength in your pelvic muscles. You can do these exercises while you stand or sit. ? Squeeze the same muscles you would use to stop your urine. Your belly and thighs should not move. ? Hold the squeeze for 3 seconds, and then relax for 3 seconds. ? Start with 3 seconds. Then add 1 second each week until you are able to squeeze for 10 seconds. ? Repeat the exercise 10 to 15 times for each session. Do three or more sessions each day. · Find a class for new mothers and new babies that has an exercise time. · If you had a  birth, give yourself a bit more time before you exercise, and be careful. When should you call for help? Call 911 anytime you think you may need emergency care. For example, call if:    · You passed out (lost consciousness).    Call your doctor now or seek immediate medical care if:    · You have severe vaginal bleeding. This means you are passing blood clots and soaking through a pad each hour for 2 or more hours.     · You are dizzy or lightheaded, or you feel like you may faint.     · You have a fever.     · You have new belly pain, or your pain gets worse.    Watch closely for changes in your health, and be sure to contact your doctor if:    · Your vaginal bleeding seems to be getting heavier.     · You have new or worse vaginal discharge.     · You feel sad, anxious, or hopeless for more than a few days.     · You do not get better as expected. Where can you learn more? Go to http://galo-marilyn.info/. Enter A461 in the search box to learn more about \"After Your Delivery (the Postpartum Period): Care Instructions. \"  Current as of: 2018  Content Version: 11.9  © 2239-5971 [employer].  Care instructions adapted under license by [employer] (which disclaims liability or warranty for this information). If you have questions about a medical condition or this instruction, always ask your healthcare professional. [place] [address] any warranty or liability for your use of this information.

## 2019-06-21 PROBLEM — Z3A.40 40 WEEKS GESTATION OF PREGNANCY: Status: RESOLVED | Noted: 2019-06-16 | Resolved: 2019-06-21

## 2019-06-21 PROBLEM — O42.00 PROM WITH ONSET OF LABOR WITHIN 24 HOURS OF RUPTURE: Status: RESOLVED | Noted: 2019-06-16 | Resolved: 2019-06-21

## 2019-06-24 ENCOUNTER — TELEPHONE (OUTPATIENT)
Dept: CASE MANAGEMENT | Age: 29
End: 2019-06-24

## 2019-06-24 NOTE — TELEPHONE ENCOUNTER
SW consult received from [facility] office due to concerns of postpartum depression. Phone call to patient at [phone]. No answer; message left.     MELVA Guerra   [phone]

## 2019-06-24 NOTE — TELEPHONE ENCOUNTER
received call back from patient. Patient states, \"I'm coming full [place] with everything right now. \"  She reports being \"super emotional and crying a lot. \"  Patient tearful with  on the phone. Patient feels that the Percocet she was previously taking \"messed with me. \"  She reports having scary dreams, experiencing hallucinations, and being confused. She has come off of the Percocet with last dose being on Friday (6/21). She reports a decrease in depressive symptoms since discontinuing the Percocet. Patient/ are currently staying with her parents in order to have additional support/assistance. Patient agreeable for  to e-mail her information on local resources/supports. Information provided on assistance available thru [employer] as well as \"Navigating Motherhood\" group thru [address] [place] [address]. Patient's e-mail: [email or web address]. Patient agreeable for  to call back next week to check-in. Patient has this 's contact information should any needs/questions arise.     MELVA Dickson  [facility]   [phone]

## 2019-07-02 ENCOUNTER — TELEPHONE (OUTPATIENT)
Dept: CASE MANAGEMENT | Age: 29
End: 2019-07-02

## 2019-07-02 NOTE — TELEPHONE ENCOUNTER
Phone call to patient at [phone]. Patient states, \"Things are definitely much better. \"  Patient, , and baby were previously staying with patient's parents for support, but have recently returned to their own home. Patient feels that this has been helpful with the adjustment of having a . Patient denies any depression/anxiety and attributes previous emotional difficulties to being on the Percocet. Patient denied any needs from  and is receptive to  calling back in the future to check-in.     Meriam Gaucher, LISW-CP  [facility]   [phone]

## 2019-08-13 ENCOUNTER — TELEPHONE (OUTPATIENT)
Dept: CASE MANAGEMENT | Age: 29
End: 2019-08-13

## 2019-08-13 NOTE — TELEPHONE ENCOUNTER
Phone call to patient at [phone]. No answer; message left on VM.       MELVA Mccarthy  [facility]   [phone]

## 2019-08-29 ENCOUNTER — TELEPHONE (OUTPATIENT)
Dept: CASE MANAGEMENT | Age: 29
End: 2019-08-29

## 2019-08-29 NOTE — TELEPHONE ENCOUNTER
Phone call to patient at [phone].   No answer; message left on VM.       MELVA Pedersen  [facility]   [phone]

## 2020-01-02 PROBLEM — Z23 ENCOUNTER FOR IMMUNIZATION: Status: RESOLVED | Noted: 2018-11-05 | Resolved: 2020-01-02

## 2020-01-02 PROBLEM — Z28.39 MATERNAL VARICELLA, NON-IMMUNE: Status: RESOLVED | Noted: 2018-11-07 | Resolved: 2020-01-02

## 2020-01-02 PROBLEM — Z78.9 BREASTFEEDING (INFANT): Status: ACTIVE | Noted: 2020-01-02

## 2020-01-02 PROBLEM — O09.899 SHORT INTERVAL BETWEEN PREGNANCIES AFFECTING PREGNANCY, ANTEPARTUM: Status: ACTIVE | Noted: 2020-01-02

## 2020-01-02 PROBLEM — Z34.90 PREGNANCY: Status: ACTIVE | Noted: 2020-01-02

## 2020-01-02 PROBLEM — O09.899 MATERNAL VARICELLA, NON-IMMUNE: Status: RESOLVED | Noted: 2018-11-07 | Resolved: 2020-01-02

## 2020-01-02 PROBLEM — Z34.90 PREGNANCY: Status: RESOLVED | Noted: 2018-11-05 | Resolved: 2020-01-02

## 2020-01-07 PROBLEM — O09.899 MATERNAL VARICELLA, NON-IMMUNE: Status: ACTIVE | Noted: 2020-01-07

## 2020-01-07 PROBLEM — Z28.39 MATERNAL VARICELLA, NON-IMMUNE: Status: ACTIVE | Noted: 2020-01-07

## 2020-07-15 ENCOUNTER — ANESTHESIA (OUTPATIENT)
Dept: LABOR AND DELIVERY | Age: 30
DRG: 540 | End: 2020-07-15
Payer: COMMERCIAL

## 2020-07-15 ENCOUNTER — HOSPITAL ENCOUNTER (INPATIENT)
Age: 30
LOS: 2 days | Discharge: HOME | DRG: 540 | End: 2020-07-17
Attending: OBSTETRICS & GYNECOLOGY | Admitting: OBSTETRICS & GYNECOLOGY
Payer: COMMERCIAL

## 2020-07-15 ENCOUNTER — ANESTHESIA EVENT (OUTPATIENT)
Dept: LABOR AND DELIVERY | Age: 30
DRG: 540 | End: 2020-07-15
Payer: COMMERCIAL

## 2020-07-15 DIAGNOSIS — Z34.90 ENCOUNTER FOR ELECTIVE INDUCTION OF LABOR: Primary | ICD-10-CM

## 2020-07-15 LAB
ABO + RH BLD: NORMAL
ALBUMIN SERPL-MCNC: 2.7 G/DL (ref 3.5–5)
ALBUMIN/GLOB SERPL: 0.6 {RATIO} (ref 1.2–3.5)
ALP SERPL-CCNC: 176 U/L (ref 50–136)
ALT SERPL-CCNC: 15 U/L (ref 12–65)
ANION GAP SERPL CALC-SCNC: 6 MMOL/L (ref 7–16)
ARTERIAL PATENCY WRIST A: ABNORMAL
ARTERIAL PATENCY WRIST A: ABNORMAL
AST SERPL-CCNC: 19 U/L (ref 15–37)
BASE DEFICIT BLD-SCNC: 1 MMOL/L
BASE EXCESS BLD CALC-SCNC: 1 MMOL/L
BDY SITE: ABNORMAL
BDY SITE: ABNORMAL
BILIRUB SERPL-MCNC: 0.4 MG/DL (ref 0.2–1.1)
BLOOD BANK CMNT PATIENT-IMP: NORMAL
BLOOD GROUP ANTIBODIES SERPL: NORMAL
BUN SERPL-MCNC: 14 MG/DL (ref 6–23)
CA-I BLD-MCNC: 1.64 MMOL/L (ref 1.12–1.32)
CA-I BLD-MCNC: 1.64 MMOL/L (ref 1.12–1.32)
CALCIUM SERPL-MCNC: 9 MG/DL (ref 8.3–10.4)
CHLORIDE SERPL-SCNC: 107 MMOL/L (ref 98–107)
CO2 SERPL-SCNC: 22 MMOL/L (ref 21–32)
CREAT SERPL-MCNC: 0.59 MG/DL (ref 0.6–1)
ERYTHROCYTE [DISTWIDTH] IN BLOOD BY AUTOMATED COUNT: 12.9 % (ref 11.9–14.6)
FETAL SCREEN,FMHS: NORMAL
GAS FLOW.O2 O2 DELIVERY SYS: ABNORMAL L/MIN
GAS FLOW.O2 O2 DELIVERY SYS: ABNORMAL L/MIN
GLOBULIN SER CALC-MCNC: 4.3 G/DL (ref 2.3–3.5)
GLUCOSE BLD STRIP.AUTO-MCNC: 61 MG/DL (ref 65–100)
GLUCOSE BLD STRIP.AUTO-MCNC: 71 MG/DL (ref 65–100)
GLUCOSE SERPL-MCNC: 94 MG/DL (ref 65–100)
HCO3 BLD-SCNC: 24.8 MMOL/L (ref 22–26)
HCO3 BLD-SCNC: 29.3 MMOL/L (ref 22–26)
HCT VFR BLD AUTO: 40.4 % (ref 35.8–46.3)
HGB BLD-MCNC: 14.1 G/DL (ref 11.7–15.4)
MCH RBC QN AUTO: 31.8 PG (ref 26.1–32.9)
MCHC RBC AUTO-ENTMCNC: 34.9 G/DL (ref 31.4–35)
MCV RBC AUTO: 91 FL (ref 79.6–97.8)
NRBC # BLD: 0 K/UL (ref 0–0.2)
PCO2 BLD: 44.4 MMHG (ref 35–45)
PCO2 BLD: 60.6 MMHG (ref 35–45)
PH BLD: 7.29 [PH] (ref 7.35–7.45)
PH BLD: 7.36 [PH] (ref 7.35–7.45)
PLATELET # BLD AUTO: 220 K/UL (ref 150–450)
PMV BLD AUTO: 11.7 FL (ref 9.4–12.3)
PO2 BLD: 10 MMHG (ref 75–100)
PO2 BLD: 29 MMHG (ref 75–100)
POTASSIUM BLD-SCNC: 4.1 MMOL/L (ref 3.5–5.1)
POTASSIUM BLD-SCNC: 4.7 MMOL/L (ref 3.5–5.1)
POTASSIUM SERPL-SCNC: 4.1 MMOL/L (ref 3.5–5.1)
PROT SERPL-MCNC: 7 G/DL (ref 6.3–8.2)
RBC # BLD AUTO: 4.44 M/UL (ref 4.05–5.2)
SAO2 % BLD: 52 % (ref 95–98)
SAO2 % BLD: 8 % (ref 95–98)
SERVICE CMNT-IMP: ABNORMAL
SERVICE CMNT-IMP: ABNORMAL
SODIUM BLD-SCNC: 140 MMOL/L (ref 136–145)
SODIUM BLD-SCNC: 140 MMOL/L (ref 136–145)
SODIUM SERPL-SCNC: 135 MMOL/L (ref 136–145)
SPECIMEN EXP DATE BLD: NORMAL
SPECIMEN TYPE: ABNORMAL
SPECIMEN TYPE: ABNORMAL
WBC # BLD AUTO: 12.2 K/UL (ref 4.3–11.1)

## 2020-07-15 PROCEDURE — 74011000250 HC RX REV CODE- 250: Performed by: ANESTHESIOLOGY

## 2020-07-15 PROCEDURE — 77030018846 HC SOL IRR STRL H20 ICUM -A: Performed by: OBSTETRICS & GYNECOLOGY

## 2020-07-15 PROCEDURE — 77030018836 HC SOL IRR NACL ICUM -A: Performed by: OBSTETRICS & GYNECOLOGY

## 2020-07-15 PROCEDURE — 77030031139 HC SUT VCRL2 J&J -A: Performed by: OBSTETRICS & GYNECOLOGY

## 2020-07-15 PROCEDURE — 77030003665 HC NDL SPN BBMI -A: Performed by: ANESTHESIOLOGY

## 2020-07-15 PROCEDURE — 82803 BLOOD GASES ANY COMBINATION: CPT

## 2020-07-15 PROCEDURE — 77030002933 HC SUT MCRYL J&J -A: Performed by: OBSTETRICS & GYNECOLOGY

## 2020-07-15 PROCEDURE — 77030002974 HC SUT PLN J&J -A: Performed by: OBSTETRICS & GYNECOLOGY

## 2020-07-15 PROCEDURE — 74011250636 HC RX REV CODE- 250/636: Performed by: STUDENT IN AN ORGANIZED HEALTH CARE EDUCATION/TRAINING PROGRAM

## 2020-07-15 PROCEDURE — 74011000250 HC RX REV CODE- 250: Performed by: OBSTETRICS & GYNECOLOGY

## 2020-07-15 PROCEDURE — 86900 BLOOD TYPING SEROLOGIC ABO: CPT

## 2020-07-15 PROCEDURE — 85027 COMPLETE CBC AUTOMATED: CPT

## 2020-07-15 PROCEDURE — 77030002888 HC SUT CHRMC J&J -A: Performed by: OBSTETRICS & GYNECOLOGY

## 2020-07-15 PROCEDURE — 76060000078 HC EPIDURAL ANESTHESIA: Performed by: OBSTETRICS & GYNECOLOGY

## 2020-07-15 PROCEDURE — 74011250636 HC RX REV CODE- 250/636: Performed by: OBSTETRICS & GYNECOLOGY

## 2020-07-15 PROCEDURE — 74011250636 HC RX REV CODE- 250/636

## 2020-07-15 PROCEDURE — 77030032490 HC SLV COMPR SCD KNE COVD -B: Performed by: OBSTETRICS & GYNECOLOGY

## 2020-07-15 PROCEDURE — 65270000029 HC RM PRIVATE

## 2020-07-15 PROCEDURE — 74011250636 HC RX REV CODE- 250/636: Performed by: ANESTHESIOLOGY

## 2020-07-15 PROCEDURE — 77030007880 HC KT SPN EPDRL BBMI -B: Performed by: ANESTHESIOLOGY

## 2020-07-15 PROCEDURE — 76010000391 HC C SECN FIRST 1 HR: Performed by: OBSTETRICS & GYNECOLOGY

## 2020-07-15 PROCEDURE — 85461 HEMOGLOBIN FETAL: CPT

## 2020-07-15 PROCEDURE — 74011000250 HC RX REV CODE- 250: Performed by: STUDENT IN AN ORGANIZED HEALTH CARE EDUCATION/TRAINING PROGRAM

## 2020-07-15 PROCEDURE — 82947 ASSAY GLUCOSE BLOOD QUANT: CPT

## 2020-07-15 PROCEDURE — 74011250637 HC RX REV CODE- 250/637: Performed by: ANESTHESIOLOGY

## 2020-07-15 PROCEDURE — 4A1HXCZ MONITORING OF PRODUCTS OF CONCEPTION, CARDIAC RATE, EXTERNAL APPROACH: ICD-10-PCS | Performed by: OBSTETRICS & GYNECOLOGY

## 2020-07-15 PROCEDURE — 75410000003 HC RECOV DEL/VAG/CSECN EA 0.5 HR: Performed by: OBSTETRICS & GYNECOLOGY

## 2020-07-15 PROCEDURE — 76010000392 HC C SECN EA ADDL 0.5 HR: Performed by: OBSTETRICS & GYNECOLOGY

## 2020-07-15 PROCEDURE — 80053 COMPREHEN METABOLIC PANEL: CPT

## 2020-07-15 RX ORDER — NALOXONE HYDROCHLORIDE 0.4 MG/ML
0.2 INJECTION, SOLUTION INTRAMUSCULAR; INTRAVENOUS; SUBCUTANEOUS
Status: ACTIVE | OUTPATIENT
Start: 2020-07-15 | End: 2020-07-16

## 2020-07-15 RX ORDER — LIDOCAINE HYDROCHLORIDE 20 MG/ML
JELLY TOPICAL
Status: DISCONTINUED | OUTPATIENT
Start: 2020-07-15 | End: 2020-07-15 | Stop reason: HOSPADM

## 2020-07-15 RX ORDER — MORPHINE SULFATE 1 MG/ML
INJECTION, SOLUTION EPIDURAL; INTRATHECAL; INTRAVENOUS
Status: COMPLETED | OUTPATIENT
Start: 2020-07-15 | End: 2020-07-15

## 2020-07-15 RX ORDER — KETOROLAC TROMETHAMINE 30 MG/ML
15 INJECTION, SOLUTION INTRAMUSCULAR; INTRAVENOUS
Status: DISPENSED | OUTPATIENT
Start: 2020-07-15 | End: 2020-07-16

## 2020-07-15 RX ORDER — TRISODIUM CITRATE DIHYDRATE AND CITRIC ACID MONOHYDRATE 500; 334 MG/5ML; MG/5ML
30 SOLUTION ORAL ONCE
Status: COMPLETED | OUTPATIENT
Start: 2020-07-15 | End: 2020-07-15

## 2020-07-15 RX ORDER — BUTORPHANOL TARTRATE 2 MG/ML
1 INJECTION INTRAMUSCULAR; INTRAVENOUS
Status: DISCONTINUED | OUTPATIENT
Start: 2020-07-15 | End: 2020-07-15 | Stop reason: HOSPADM

## 2020-07-15 RX ORDER — OXYTOCIN/RINGER'S LACTATE 30/500 ML
250 PLASTIC BAG, INJECTION (ML) INTRAVENOUS ONCE
Status: ACTIVE | OUTPATIENT
Start: 2020-07-15 | End: 2020-07-15

## 2020-07-15 RX ORDER — DEXTROSE, SODIUM CHLORIDE, SODIUM LACTATE, POTASSIUM CHLORIDE, AND CALCIUM CHLORIDE 5; .6; .31; .03; .02 G/100ML; G/100ML; G/100ML; G/100ML; G/100ML
125 INJECTION, SOLUTION INTRAVENOUS CONTINUOUS
Status: DISCONTINUED | OUTPATIENT
Start: 2020-07-15 | End: 2020-07-16

## 2020-07-15 RX ORDER — SODIUM CHLORIDE, SODIUM LACTATE, POTASSIUM CHLORIDE, CALCIUM CHLORIDE 600; 310; 30; 20 MG/100ML; MG/100ML; MG/100ML; MG/100ML
500 INJECTION, SOLUTION INTRAVENOUS CONTINUOUS
Status: DISCONTINUED | OUTPATIENT
Start: 2020-07-15 | End: 2020-07-15 | Stop reason: HOSPADM

## 2020-07-15 RX ORDER — ONDANSETRON 2 MG/ML
INJECTION INTRAMUSCULAR; INTRAVENOUS AS NEEDED
Status: DISCONTINUED | OUTPATIENT
Start: 2020-07-15 | End: 2020-07-15 | Stop reason: HOSPADM

## 2020-07-15 RX ORDER — ACETAMINOPHEN 325 MG/1
650 TABLET ORAL
Status: DISCONTINUED | OUTPATIENT
Start: 2020-07-15 | End: 2020-07-17 | Stop reason: HOSPADM

## 2020-07-15 RX ORDER — OXYCODONE HYDROCHLORIDE 5 MG/1
10 TABLET ORAL
Status: DISPENSED | OUTPATIENT
Start: 2020-07-15 | End: 2020-07-16

## 2020-07-15 RX ORDER — LIDOCAINE HYDROCHLORIDE 10 MG/ML
1 INJECTION INFILTRATION; PERINEURAL
Status: DISCONTINUED | OUTPATIENT
Start: 2020-07-15 | End: 2020-07-15 | Stop reason: HOSPADM

## 2020-07-15 RX ORDER — HYDROMORPHONE HYDROCHLORIDE 1 MG/ML
0.5 INJECTION, SOLUTION INTRAMUSCULAR; INTRAVENOUS; SUBCUTANEOUS
Status: ACTIVE | OUTPATIENT
Start: 2020-07-15 | End: 2020-07-16

## 2020-07-15 RX ORDER — SODIUM CHLORIDE, SODIUM LACTATE, POTASSIUM CHLORIDE, CALCIUM CHLORIDE 600; 310; 30; 20 MG/100ML; MG/100ML; MG/100ML; MG/100ML
125 INJECTION, SOLUTION INTRAVENOUS CONTINUOUS
Status: DISCONTINUED | OUTPATIENT
Start: 2020-07-15 | End: 2020-07-17 | Stop reason: HOSPADM

## 2020-07-15 RX ORDER — SODIUM CHLORIDE 0.9 % (FLUSH) 0.9 %
5-40 SYRINGE (ML) INJECTION AS NEEDED
Status: DISCONTINUED | OUTPATIENT
Start: 2020-07-15 | End: 2020-07-16

## 2020-07-15 RX ORDER — OXYTOCIN/RINGER'S LACTATE 30/500 ML
0-25 PLASTIC BAG, INJECTION (ML) INTRAVENOUS
Status: DISCONTINUED | OUTPATIENT
Start: 2020-07-15 | End: 2020-07-16

## 2020-07-15 RX ORDER — CEFAZOLIN SODIUM/WATER 2 G/20 ML
2 SYRINGE (ML) INTRAVENOUS ONCE
Status: COMPLETED | OUTPATIENT
Start: 2020-07-15 | End: 2020-07-15

## 2020-07-15 RX ORDER — SODIUM CHLORIDE 0.9 % (FLUSH) 0.9 %
5-40 SYRINGE (ML) INJECTION EVERY 8 HOURS
Status: DISCONTINUED | OUTPATIENT
Start: 2020-07-15 | End: 2020-07-16

## 2020-07-15 RX ORDER — FAMOTIDINE 20 MG/1
20 TABLET, FILM COATED ORAL ONCE
Status: COMPLETED | OUTPATIENT
Start: 2020-07-15 | End: 2020-07-15

## 2020-07-15 RX ORDER — MINERAL OIL
120 OIL (ML) ORAL
Status: DISCONTINUED | OUTPATIENT
Start: 2020-07-15 | End: 2020-07-15 | Stop reason: HOSPADM

## 2020-07-15 RX ORDER — BUTORPHANOL TARTRATE 2 MG/ML
1 INJECTION INTRAMUSCULAR; INTRAVENOUS
Status: DISPENSED | OUTPATIENT
Start: 2020-07-15 | End: 2020-07-16

## 2020-07-15 RX ORDER — OXYTOCIN/RINGER'S LACTATE 30/500 ML
PLASTIC BAG, INJECTION (ML) INTRAVENOUS
Status: DISCONTINUED | OUTPATIENT
Start: 2020-07-15 | End: 2020-07-15 | Stop reason: HOSPADM

## 2020-07-15 RX ORDER — BUPIVACAINE HYDROCHLORIDE 7.5 MG/ML
INJECTION, SOLUTION INTRASPINAL
Status: COMPLETED | OUTPATIENT
Start: 2020-07-15 | End: 2020-07-15

## 2020-07-15 RX ORDER — NALBUPHINE HYDROCHLORIDE 20 MG/ML
5 INJECTION, SOLUTION INTRAMUSCULAR; INTRAVENOUS; SUBCUTANEOUS
Status: DISCONTINUED | OUTPATIENT
Start: 2020-07-15 | End: 2020-07-15 | Stop reason: RX

## 2020-07-15 RX ADMIN — Medication 500 ML/HR: at 13:38

## 2020-07-15 RX ADMIN — PHENYLEPHRINE HYDROCHLORIDE 150 MCG: 10 INJECTION INTRAVENOUS at 13:23

## 2020-07-15 RX ADMIN — ONDANSETRON 4 MG: 2 INJECTION INTRAMUSCULAR; INTRAVENOUS at 13:38

## 2020-07-15 RX ADMIN — PHENYLEPHRINE HYDROCHLORIDE 150 MCG: 10 INJECTION INTRAVENOUS at 13:21

## 2020-07-15 RX ADMIN — PHENYLEPHRINE HYDROCHLORIDE 150 MCG: 10 INJECTION INTRAVENOUS at 13:29

## 2020-07-15 RX ADMIN — MORPHINE SULFATE 0.25 MG: 1 INJECTION, SOLUTION EPIDURAL; INTRATHECAL; INTRAVENOUS at 13:20

## 2020-07-15 RX ADMIN — BUTORPHANOL TARTRATE 1 MG: 2 INJECTION, SOLUTION INTRAMUSCULAR; INTRAVENOUS at 17:13

## 2020-07-15 RX ADMIN — OXYCODONE 10 MG: 5 TABLET ORAL at 19:46

## 2020-07-15 RX ADMIN — PHENYLEPHRINE HYDROCHLORIDE 150 MCG: 10 INJECTION INTRAVENOUS at 13:45

## 2020-07-15 RX ADMIN — SODIUM CHLORIDE, SODIUM LACTATE, POTASSIUM CHLORIDE, AND CALCIUM CHLORIDE 125 ML/HR: 600; 310; 30; 20 INJECTION, SOLUTION INTRAVENOUS at 18:19

## 2020-07-15 RX ADMIN — FAMOTIDINE 20 MG: 20 TABLET, FILM COATED ORAL at 12:19

## 2020-07-15 RX ADMIN — BUTORPHANOL TARTRATE 1 MG: 2 INJECTION, SOLUTION INTRAMUSCULAR; INTRAVENOUS at 21:25

## 2020-07-15 RX ADMIN — BUPIVACAINE HYDROCHLORIDE IN DEXTROSE 12.5 MG: 7.5 INJECTION, SOLUTION SUBARACHNOID at 13:20

## 2020-07-15 RX ADMIN — ACETAMINOPHEN 650 MG: 325 TABLET, FILM COATED ORAL at 21:30

## 2020-07-15 RX ADMIN — SODIUM CHLORIDE, SODIUM LACTATE, POTASSIUM CHLORIDE, CALCIUM CHLORIDE, AND DEXTROSE MONOHYDRATE 125 ML/HR: 600; 310; 30; 20; 5 INJECTION, SOLUTION INTRAVENOUS at 07:30

## 2020-07-15 RX ADMIN — PHENYLEPHRINE HYDROCHLORIDE 150 MCG: 10 INJECTION INTRAVENOUS at 13:56

## 2020-07-15 RX ADMIN — KETOROLAC TROMETHAMINE 15 MG: 30 INJECTION, SOLUTION INTRAMUSCULAR at 19:46

## 2020-07-15 RX ADMIN — PHENYLEPHRINE HYDROCHLORIDE 150 MCG: 10 INJECTION INTRAVENOUS at 13:50

## 2020-07-15 RX ADMIN — ACETAMINOPHEN 650 MG: 325 TABLET, FILM COATED ORAL at 15:27

## 2020-07-15 RX ADMIN — WATER 2 G: 1 INJECTION INTRAVENOUS at 12:19

## 2020-07-15 RX ADMIN — SODIUM CITRATE AND CITRIC ACID MONOHYDRATE 30 ML: 500; 334 SOLUTION ORAL at 12:19

## 2020-07-15 RX ADMIN — SODIUM CHLORIDE, SODIUM LACTATE, POTASSIUM CHLORIDE, AND CALCIUM CHLORIDE 500 ML/HR: 600; 310; 30; 20 INJECTION, SOLUTION INTRAVENOUS at 12:19

## 2020-07-15 NOTE — ANESTHESIA PROCEDURE NOTES
Spinal Block    Start time: 7/15/2020 1:16 PM  End time: 7/15/2020 1:20 PM  Performed by: Jovon Donato MD  Authorized by: Jovon Donato MD     Pre-procedure:   Indications: primary anesthetic  Preanesthetic Checklist: patient identified, risks and benefits discussed, anesthesia consent, site marked, patient being monitored and timeout performed    Timeout Time: 13:16          Spinal Block:   Patient Position:  Seated  Prep Region:  Lumbar  Prep: chlorhexidine      Location:  L3-4  Technique:  Single shot    Local Dose (mL):  3    Needle:   Needle Type:  Pencan  Needle Gauge:  25 G  Attempts:  1      Events: CSF confirmed, no blood with aspiration and no paresthesia        Assessment:  Insertion:  Uncomplicated  Patient tolerance:  Patient tolerated the procedure well with no immediate complications

## 2020-07-15 NOTE — PROGRESS NOTES
Visited with pt to answer any questions she may have.  She seems much better with idea of csec for breech  R/B/A discussed with pt

## 2020-07-15 NOTE — PROGRESS NOTES
RN to bedside. Pt no longer tearful . Questions appropriate to situation and reasonable. Concerns addressed and questions answered, including recovery time both inpatient and at home. Pt and SO voiced understanding and denies other questions at this time.

## 2020-07-15 NOTE — PROGRESS NOTES
SBAR OUT Report: Mother    Verbal report given to Rod Damian RN (full name & credentials) on this patient, who is now being transferred to MIU (unit) for routine progression of care. Report consisted of patient's Situation, Background, Assessment and Recommendations (SBAR).  ID bands were compared with the identification form, and verified with the patient and receiving nurse. Information from the SBAR, Kardex, OR Summary, Procedure Summary, Intake/Output, MAR, Accordion, Recent Results and Med Rec Status and the [place] Report was reviewed with the receiving nurse; opportunity for questions and clarification provided.

## 2020-07-15 NOTE — PROGRESS NOTES
RN to bedside. Abdomen prepped with CHG wipes. No clipping needed. SCD's and socks applied. LR bag 2 bolusing   Pt denies question at this time. TOCO removed.

## 2020-07-15 NOTE — PROGRESS NOTES
Admission Note    Pt admitted to [address] [place] [address] Dr LEIGH. Admission assessment completed. Discussed plan of care with patient. IV started, Consents witnessed. Lab work drawn, sent to lab. Reviewed \"pain goal\" with patient, explaining realistic expectations for pain relief.

## 2020-07-15 NOTE — PROGRESS NOTES
RN called Dr. Stephanie Srivastava regarding patient c/o itching. Orders reviewed. Nubain orders were discontinued by pharmacy due to national shortage. Dr. Stephanie Srivastava requesting RN speak with pharmacy regarding the order for alternative medication for itching. Brian Gibson called with pharmacy. Orders reviewed.

## 2020-07-15 NOTE — PROGRESS NOTES
Almond Loa Dr. Harlene Fothergill @ bedside. E /-3. Presenting US not felt. Bedside US done. BREECH presentation confirmed. Pt crying and upset. States she is \"afraid I will resent my baby if I have a  section. \" Reassured by RN and MD.   Encouraged to call out with questions. Pt voiced understanding.

## 2020-07-15 NOTE — ANESTHESIA PREPROCEDURE EVALUATION
Relevant Problems   No relevant active problems       Anesthetic History               Review of Systems / Medical History  Patient summary reviewed, nursing notes reviewed and pertinent labs reviewed    Pulmonary                   Neuro/Psych              Cardiovascular                  Exercise tolerance: >4 METS     GI/Hepatic/Renal                Endo/Other             Other Findings              Physical Exam    Airway  Mallampati: II  TM Distance: 4 - 6 cm  Neck ROM: normal range of motion   Mouth opening: Normal     Cardiovascular  Regular rate and rhythm,  S1 and S2 normal,  no murmur, click, rub, or gallop             Dental  No notable dental hx       Pulmonary  Breath sounds clear to auscultation               Abdominal         Other Findings            Anesthetic Plan    ASA: 2  Anesthesia type: spinal          Induction: Intravenous  Anesthetic plan and risks discussed with: Patient Detail Level: Zone Detail Level: Detailed Detail Level: Simple

## 2020-07-15 NOTE — PROGRESS NOTES
Dr. Mildred Myers telephoned and updated on patients arrival and desired of AROM prior to starting pitocin. Will be to beside soon.

## 2020-07-15 NOTE — L&D DELIVERY NOTE
Delivery Summary    Patient: Gillian Acosta MRN: [id number]  SSN: [id number]    YOB: 1990  Age: 34 y.o. Sex: female        Information for the patient's :  Natalia Lowry [[id number]]       Labor Events:    Labor: Yes    Steroids: None   Cervical Ripening Date/Time:       Cervical Ripening Type: None   Antibiotics During Labor: No   Rupture Identifier:      Rupture Date/Time:       Rupture Type:     Amniotic Fluid Volume: Moderate    Amniotic Fluid Description: Clear    Amniotic Fluid Odor: None    Induction: None       Induction Date/Time:        Indications for Induction:      Augmentation: None   Augmentation Date/Time:      Indications for Augmentation:     Labor complications: None       Additional complications:        Delivery Events:  Indications For Episiotomy:     Episiotomy: None   Perineal Laceration(s): None   Repaired:     Periurethral Laceration Location:      Repaired:     Labial Laceration Location:     Repaired:     Sulcal Laceration Location:     Repaired:     Vaginal Laceration Location:     Repaired:     Cervical Laceration Location:     Repaired:     Repair Suture: None   Number of Repair Packets:     Estimated Blood Loss (ml):  ml   Quantitaive Blood Loss (ml):             Delivery Date: 7/15/2020    Delivery Time: 1:38 AM   Delivery Type: , Low Transverse     Details    Trial of Labor: No   Primary/Repeat: Primary   Priority: Routine   Indications:  Breech       Sex:  Female     Gestational Age: 39w6d  Delivery Clinician:  Prince Harkins  Living Status: Living   Delivery Location: OR 02          APGARS  One minute Five minutes Ten minutes   Skin color:            Heart rate:            Grimace:            Muscle tone:            Breathing: Totals:              Presentation: Breech    Position:        Resuscitation Method:  Suctioning-bulb; Tactile Stimulation     Meconium Stained: None      Cord Information: 3 Vessels  Complications: None  Cord around:    Delayed cord clamping? No  Cord clamped date/time:   Disposition of Cord Blood: Lab    Blood Gases Sent?: Yes    Placenta:  Date/Time:    Removal: Manual Removal      Appearance: Normal      Measurements:  Birth Weight: 8 lb 1.8 oz (3.68 kg)      Birth Length: 1' 8.87\" (0.53 m)      Head Circumference: 1' 1.98\" (0.355 m)      Chest Circumference: 1' 1.39\" (0.34 m)     Abdominal Girth: Other Providers:   CAROLINA MCGINNIS;JIM TRAN;DICK ORTEGA;;CARLOS RODRIGUEZ;TANA HERRERA;CEM CONTEH;;;;MARA LUCIO;NIA TERRY;ROSEMARY LOYA, Obstetrician;Primary Nurse;Primary  Nurse;Nicu Nurse;Neonatologist;Anesthesiologist;Crna;Nurse Practitioner;Midwife;Nursery Nurse;Charge Nurse;Scrub Tech;Scrub Tech             Group B Strep:   Lab Results   Component Value Date/Time    GrBStrep, External negative 2019     Information for the patient's :  Abhijit Presley [[id number]]   No results found for: ABORH, PCTABR, PCTDIG, BILI, ABORHEXT, ABORH     Recent Labs     07/15/20  1409   HCO3I 24.8  29.3*   SO2I 52*  8*   IBD 1   SPECTI VENOUS CORD  ARTERIAL CORD   ISITE CORD  CORD   IDEV ROOM AIR  ROOM AIR   IALLEN NOT APPLICABLE  NOT APPLICABLE    INTRAUTERINE PREGNANCY  SECTION FULL OP NOTE      PATIENT: [facility]  MRN: [id number]      DATE OF PROCEDURE:  7/15/2020    PREOPERATIVE DIAGNOSIS:   delivery indicated due to breech presentation [O32.1XX0]    POSTOPERATIVE DIAGNOSIS:  same with female      ADDITIONAL DIAGNOSES: abdifatah    PROCEDURE: Low transverse  section    SURGEON:  Vitaly Balderrama DO    ASSISTANT:  none     ANESTHESIA: Regional    EBL: 500 cc qbl pending    COMPLICATIONS: none    OPERATIVE PROCEDURE: Patient was placed on the operating room table in the supine position/left lateral tilt.  Time out was done to confirm the operating procedure, surgeon, patient and site. Once confirmed by the team, procedure was started. After having adequate regional anesthesia by spinal injection, the patient was prepped and draped in the usual fashion for abdominal surgery. A Pfannenstiel incision was made and carried down sharply through the skin, subcutaneous tissue, and fascia. Fascia was sharply dissected free from underlying rectus muscles. The peritoneum was sharply entered and extended vertically. DeLee bladder blade was then placed over the bladder. The visceroperitoneal reflection over the lower uterine segment was incised transversely and the bladder flap sharply and bluntly developed. The uterus was incised transversely, then extended bluntly, and the infants head was delivered with the  fundal pressure. Fluid was clear. Cord was clamped and cut. Mouth and nose were suctioned clean. The infant was given to the NICU physician present at the time of delivery. The placenta was manually extracted. The uterus was exteriorized, wrapped in a wet lap square, curetted with a dry square, and closed in a double-layered fashion with #1 chromic. Hemostasis appeared adequate. The cul-de-sac was then irrigated and suctioned clean. . The uterus was placed in the abdomen. The gutters were irrigated and suctioned clean. Peritoneum was closed closure with 0 chromic on the peritoneum and # 1 Vicryl  on the fascia. 2-0 plain was used to reapproximate the subcutaneous tissue in a running fashion. The skin was closed with 4.0 monocryll. The patient tolerated the procedure well and went to the recovery room in satisfactory condition. All laps, sponge needle and instrument counts were correct X 2.

## 2020-07-15 NOTE — PROGRESS NOTES
In OR fetal resuscitative efforts as follows  1341 blow by oxygen  1342 o2 mask applied  1343 O2 92 %   1346 Deep suction  1347 O2 85%   1349 O2 87%   1351 O2 80 %     1417 out of OR   1419 in MIU

## 2020-07-15 NOTE — PROGRESS NOTES
SW consult received -  will meet with patient after delivery.     RADHAMES Escalante-TIFFANIE  [facility]   [phone]

## 2020-07-15 NOTE — PROGRESS NOTES
Shift assessment complete. POC discussed. Pt denies needs at this time. Encouraged to call out PRN. Pt voiced understanding.      Ice, water, jello and chicken broth taken to bedside per patient request.

## 2020-07-15 NOTE — H&P
History & Physical    Name: Yanely Pierce MRN: [id number]  SSN: [id number]    YOB: 1990  Age: 34 y.o. Sex: female      Subjective:     Estimated Date of Delivery: 7/10/20  OB History    Para Term  AB Living   2 1 1 0 0 1   SAB TAB Ectopic Molar Multiple Live Births   0 0 0   0 1      # Outcome Date GA Lbr Harry/2nd Weight Sex Delivery Anes PTL Lv   2 Current            1 Term 19 40w3d 10:12 / 01:07 7 lb 1.4 oz (3.215 kg) F Vag-Spont EPI N JE       Ms. Ming Angel admitted with pregnancy at 40w5d for  section due to breech. Attempted to break water and did not feel presenting part. Prenatal course was normal. Please see prenatal records for details. No past medical history on file. No past surgical history on file. Social History     Occupational History    Not on file   Tobacco Use    Smoking status: Never Smoker    Smokeless tobacco: Never Used   Substance and Sexual Activity    Alcohol use: No    Drug use: No    Sexual activity: Yes     Partners: Male     Birth control/protection: None     Family History   Problem Relation Age of Onset    No Known Problems Mother     No Known Problems Father     No Known Problems Maternal Grandmother     Lung Cancer Maternal Grandfather     No Known Problems Paternal Grandmother     Cancer Paternal Grandfather         Type unknown       Allergies   Allergen Reactions    Sulfa (Sulfonamide Antibiotics) Unknown (comments)     As a child     Prior to Admission medications    Medication Sig Start Date End Date Taking? Authorizing Provider   omeprazole (PRILOSEC OTC) 20 mg tablet Take 20 mg by mouth daily. Yes Provider, Historical   acetaminophen (TYLENOL EXTRA STRENGTH) 500 mg tablet Take 500 mg by mouth every six (6) hours as needed for Pain. Yes Provider, Historical   [id number] nadeem-folic-dha (PRENATAL DHA+COMPLETE PRENATAL) [id number] mg-mcg-mg cmpk Take  by mouth.    Yes Provider, Historical hydrocortisone-pramoxine (ANALPRAM-HC) 2.5-1 % rectal cream Insert  into rectum three (3) times daily. 19   Michael Purdy MD        Review of Systems: A comprehensive review of systems was negative except for that written in the History of Present Illness. Objective:     Vitals: There were no vitals filed for this visit. Physical Exam:  Patient without distress. Heart: Regular rate and rhythm or S1S2 present  Lung: clear to auscultation throughout lung fields, no wheezes, no rales, no rhonchi and normal respiratory effort  Abdomen: soft, nontender  Cervical Exam: 1 cm dilated    40% effaced    -3 station    Presenting Part: breech  Lower Extremities:  - Edema No  Membranes:  Intact  Fetal Heart Rate: Reactive  On us -visible pockets of fluid not seen  Prenatal Labs:   Lab Results   Component Value Date/Time    ABO/Rh(D) AB NEGATIVE 07/15/2020 07:33 AM    Rubella, External immune 2020    GrBStrep, External negative 2019    HBsAg, External negative 2020    HIV, External NR 2020    RPR, External NR 2020    Gonorrhea, External negative 2018    Chlamydia, External negative 2018    ABO,Rh AB negative 2020         Impression/Plan:     Plan:  Admit for  section. Group B Strep was negative. Discussed the risks of surgery including the risks of bleeding, infection, deep vein thrombosis, and surgical injuries to internal organs including but not limited to the bowels, bladder, rectum, and female reproductive organs. The patient understands the risks; any and all questions were answered to the patient's satisfaction.

## 2020-07-15 NOTE — ANESTHESIA POSTPROCEDURE EVALUATION
Procedure(s):   SECTION.     spinal    Anesthesia Post Evaluation      Multimodal analgesia: multimodal analgesia used between 6 hours prior to anesthesia start to PACU discharge  Patient location during evaluation: bedside  Patient participation: complete - patient participated  Level of consciousness: awake and awake and alert  Pain management: adequate  Airway patency: patent  Anesthetic complications: no  Cardiovascular status: acceptable  Respiratory status: acceptable  Hydration status: acceptable  Post anesthesia nausea and vomiting:  controlled      INITIAL Post-op Vital signs:   Vitals Value Taken Time   /54 7/15/2020  2:40 PM   Temp 36.6 °C (97.9 °F) 7/15/2020  2:40 PM   Pulse 83 7/15/2020  2:40 PM   Resp 16 7/15/2020  2:40 PM   SpO2 100 % 7/15/2020  2:40 PM

## 2020-07-16 LAB
BASOPHILS # BLD: 0 K/UL (ref 0–0.2)
BASOPHILS NFR BLD: 0 % (ref 0–2)
DIFFERENTIAL METHOD BLD: ABNORMAL
EOSINOPHIL # BLD: 0 K/UL (ref 0–0.8)
EOSINOPHIL NFR BLD: 0 % (ref 0.5–7.8)
ERYTHROCYTE [DISTWIDTH] IN BLOOD BY AUTOMATED COUNT: 13.2 % (ref 11.9–14.6)
HCT VFR BLD AUTO: 32.5 % (ref 35.8–46.3)
HGB BLD-MCNC: 10.6 G/DL (ref 11.7–15.4)
IMM GRANULOCYTES # BLD AUTO: 0.1 K/UL (ref 0–0.5)
IMM GRANULOCYTES NFR BLD AUTO: 1 % (ref 0–5)
LYMPHOCYTES # BLD: 1.3 K/UL (ref 0.5–4.6)
LYMPHOCYTES NFR BLD: 11 % (ref 13–44)
MCH RBC QN AUTO: 31.1 PG (ref 26.1–32.9)
MCHC RBC AUTO-ENTMCNC: 32.6 G/DL (ref 31.4–35)
MCV RBC AUTO: 95.3 FL (ref 79.6–97.8)
MONOCYTES # BLD: 1.5 K/UL (ref 0.1–1.3)
MONOCYTES NFR BLD: 12 % (ref 4–12)
NEUTS SEG # BLD: 8.9 K/UL (ref 1.7–8.2)
NEUTS SEG NFR BLD: 75 % (ref 43–78)
NRBC # BLD: 0 K/UL (ref 0–0.2)
PLATELET # BLD AUTO: 171 K/UL (ref 150–450)
PMV BLD AUTO: 10.6 FL (ref 9.4–12.3)
RBC # BLD AUTO: 3.41 M/UL (ref 4.05–5.2)
WBC # BLD AUTO: 11.8 K/UL (ref 4.3–11.1)

## 2020-07-16 PROCEDURE — 74011250636 HC RX REV CODE- 250/636: Performed by: ANESTHESIOLOGY

## 2020-07-16 PROCEDURE — 74011250636 HC RX REV CODE- 250/636: Performed by: OBSTETRICS & GYNECOLOGY

## 2020-07-16 PROCEDURE — 74011250637 HC RX REV CODE- 250/637: Performed by: ANESTHESIOLOGY

## 2020-07-16 PROCEDURE — 65270000029 HC RM PRIVATE

## 2020-07-16 PROCEDURE — 74011250637 HC RX REV CODE- 250/637: Performed by: OBSTETRICS & GYNECOLOGY

## 2020-07-16 PROCEDURE — 36415 COLL VENOUS BLD VENIPUNCTURE: CPT

## 2020-07-16 PROCEDURE — 85025 COMPLETE CBC W/AUTO DIFF WBC: CPT

## 2020-07-16 RX ORDER — SODIUM CHLORIDE 0.9 % (FLUSH) 0.9 %
5-40 SYRINGE (ML) INJECTION EVERY 8 HOURS
Status: DISCONTINUED | OUTPATIENT
Start: 2020-07-16 | End: 2020-07-17 | Stop reason: HOSPADM

## 2020-07-16 RX ORDER — OXYCODONE AND ACETAMINOPHEN 5; 325 MG/1; MG/1
1 TABLET ORAL
Status: DISCONTINUED | OUTPATIENT
Start: 2020-07-16 | End: 2020-07-17 | Stop reason: HOSPADM

## 2020-07-16 RX ORDER — SIMETHICONE 80 MG
80 TABLET,CHEWABLE ORAL
Status: DISCONTINUED | OUTPATIENT
Start: 2020-07-16 | End: 2020-07-17 | Stop reason: HOSPADM

## 2020-07-16 RX ORDER — IBUPROFEN 400 MG/1
400 TABLET ORAL
Status: DISCONTINUED | OUTPATIENT
Start: 2020-07-16 | End: 2020-07-17 | Stop reason: HOSPADM

## 2020-07-16 RX ORDER — SODIUM CHLORIDE, SODIUM LACTATE, POTASSIUM CHLORIDE, CALCIUM CHLORIDE 600; 310; 30; 20 MG/100ML; MG/100ML; MG/100ML; MG/100ML
100 INJECTION, SOLUTION INTRAVENOUS CONTINUOUS
Status: DISCONTINUED | OUTPATIENT
Start: 2020-07-16 | End: 2020-07-17 | Stop reason: HOSPADM

## 2020-07-16 RX ORDER — ZOLPIDEM TARTRATE 5 MG/1
5 TABLET ORAL
Status: DISCONTINUED | OUTPATIENT
Start: 2020-07-16 | End: 2020-07-17 | Stop reason: HOSPADM

## 2020-07-16 RX ORDER — OXYCODONE AND ACETAMINOPHEN 10; 325 MG/1; MG/1
1 TABLET ORAL
Status: DISCONTINUED | OUTPATIENT
Start: 2020-07-16 | End: 2020-07-17 | Stop reason: HOSPADM

## 2020-07-16 RX ORDER — SODIUM CHLORIDE 0.9 % (FLUSH) 0.9 %
5-40 SYRINGE (ML) INJECTION AS NEEDED
Status: DISCONTINUED | OUTPATIENT
Start: 2020-07-16 | End: 2020-07-17 | Stop reason: HOSPADM

## 2020-07-16 RX ADMIN — KETOROLAC TROMETHAMINE 15 MG: 30 INJECTION, SOLUTION INTRAMUSCULAR at 07:35

## 2020-07-16 RX ADMIN — ACETAMINOPHEN 650 MG: 325 TABLET, FILM COATED ORAL at 10:59

## 2020-07-16 RX ADMIN — SIMETHICONE 80 MG: 80 TABLET, CHEWABLE ORAL at 18:24

## 2020-07-16 RX ADMIN — SIMETHICONE 80 MG: 80 TABLET, CHEWABLE ORAL at 11:00

## 2020-07-16 RX ADMIN — BUTORPHANOL TARTRATE 1 MG: 2 INJECTION, SOLUTION INTRAMUSCULAR; INTRAVENOUS at 12:52

## 2020-07-16 RX ADMIN — KETOROLAC TROMETHAMINE 15 MG: 30 INJECTION, SOLUTION INTRAMUSCULAR at 01:41

## 2020-07-16 RX ADMIN — SIMETHICONE 80 MG: 80 TABLET, CHEWABLE ORAL at 22:13

## 2020-07-16 RX ADMIN — IBUPROFEN 400 MG: 400 TABLET, FILM COATED ORAL at 12:52

## 2020-07-16 RX ADMIN — RHO(D) IMMUNE GLOBULIN (HUMAN) 0.3 MG: 1500 SOLUTION INTRAMUSCULAR at 18:26

## 2020-07-16 RX ADMIN — IBUPROFEN 400 MG: 400 TABLET, FILM COATED ORAL at 18:24

## 2020-07-16 RX ADMIN — BUTORPHANOL TARTRATE 1 MG: 2 INJECTION, SOLUTION INTRAMUSCULAR; INTRAVENOUS at 02:35

## 2020-07-16 RX ADMIN — OXYCODONE 5 MG: 5 TABLET ORAL at 10:58

## 2020-07-16 RX ADMIN — IBUPROFEN 400 MG: 400 TABLET, FILM COATED ORAL at 22:12

## 2020-07-16 RX ADMIN — OXYCODONE 10 MG: 5 TABLET ORAL at 01:41

## 2020-07-16 RX ADMIN — ACETAMINOPHEN 650 MG: 325 TABLET, FILM COATED ORAL at 03:53

## 2020-07-16 NOTE — PROGRESS NOTES
Anesthesiology  Post-op Note    Post-op day 1 s/p  via spinal with neuraxial opioids for post-op pain management. Visit Vitals  BP 99/55 (BP 1 Location: Left arm, BP Patient Position: At rest)   Pulse 72   Temp 36.7 °C (98 °F)   Resp 18   LMP 10/28/2019 (Approximate)   SpO2 97%   Breastfeeding Unknown     Airway patent, patient appropriately hydrated and appears euvolemic. Patient is Alert and oriented. Pain is well controlled. Pruritus is moderately controlled. Nausea is well controlled. No complaints about back or site of injection. Motor and sensory function has returned to baseline in lower extremities. Patient is satisfied with anesthetic and reports no complications. Continue current orders, then initiate surgeon's orders for pain management 24 hours after . Follow up per surgeon.

## 2020-07-16 NOTE — PROGRESS NOTES
Chart reviewed -  spoke with patient over the phone several times after having her first baby in 2019 due to behavioral concerns. Per chart, patient was experiencing scary dreams, confusion, and hallucinations. Ultimately, patient felt that she was experiencing a negative reaction to the percocet. SW consult received yesterday stating that patient informed staff, \"I'm afraid I will resent my baby if I have a . \"      SW met with patient and FOB. Discussed patient's feelings about needing a . Patient states that she was \"embarassed\" as she was already requiring an induction due to being post dates. Additionally, she was anxious about needing surgery as she typically perceives surgery as being in emergent situations. Patient verbalized being comforted by staff, her , and her mother about needing the , and she ultimately became more comfortable. Discussed patient's history of negative interactions with Percocet last year. Patient states that in addition to this reaction to the Percocet, she was also dealing with the changes and transition of becoming a first time parent. Patient/ plan to go stay with her parents after discharge in order to receive support/assistance. Patient given informational packet on  mood & anxiety disorders (resources/education). Family denies any additional needs from  at this time. Family has 's contact information should any needs/questions arise.     RADHAMES Funes-TIFFANIE  [address] [facility]   [phone]

## 2020-07-16 NOTE — PROGRESS NOTES
Shift assessment complete as noted. Patient request pain medicine. Toradol given per request. Dressing removed. Incision well approximated. Patient wishes to get up to shower. About to begin breastfeeding. RN requests patient call out for IV to be covered before the shower. Voices understanding. Other questions encouraged and answered. Encouraged to call for needs or concerns. Verbalizes understanding.

## 2020-07-16 NOTE — PROGRESS NOTES
Post-Operative Day Number 1 Progress Note    Patient doing well post-op day 1 from  delivery without significant complaints. Pain controlled on current medication. Voiding without difficulty, normal lochia. Vitals:    Patient Vitals for the past 8 hrs:   BP Temp Pulse Resp SpO2   20 0703 106/57 97.4 °F (36.3 °C) 73 18 94 %   20 0350 99/55 98 °F (36.7 °C) 72 18 97 %     Temp (24hrs), Av.8 °F (36.6 °C), Min:97.4 °F (36.3 °C), Max:98.1 °F (36.7 °C)      Vital signs stable, afebrile. Exam:  Patient without distress. Abdomen soft, fundus firm at level of umbilicus, non tender. Incision dry and clean without erythema. Lower extremities are negative for swelling, cords or tenderness. Lab/Data Review:  CBC:   Lab Results   Component Value Date/Time    WBC 11.8 (H) 2020 05:39 AM    HGB 10.6 (L) 2020 05:39 AM    HCT 32.5 (L) 2020 05:39 AM     2020 05:39 AM       Assessment and Plan:  Patient appears to be having uncomplicated post- course. Continue routine post-op care and maternal education.

## 2020-07-16 NOTE — PROGRESS NOTES
Cole removed with problem. IV capped. Patient up to bathroom with partial assistance. Alessia-care taught and completed. Questions encouraged and answered. Patient back to bed, encouraged to call for needs or concerns. Verbalizes understanding.

## 2020-07-16 NOTE — PROGRESS NOTES
RN to room per patient request.  Medication for pain given. IV wrapped so patient could get up to shower. Educated on showering for the first time. Opportunities for questions offered and answered.

## 2020-07-17 VITALS
HEART RATE: 73 BPM | DIASTOLIC BLOOD PRESSURE: 65 MMHG | OXYGEN SATURATION: 97 % | SYSTOLIC BLOOD PRESSURE: 106 MMHG | RESPIRATION RATE: 18 BRPM | TEMPERATURE: 98.6 F

## 2020-07-17 PROCEDURE — 74011250637 HC RX REV CODE- 250/637: Performed by: OBSTETRICS & GYNECOLOGY

## 2020-07-17 RX ORDER — OXYCODONE AND ACETAMINOPHEN 10; 325 MG/1; MG/1
1 TABLET ORAL
Qty: 20 TAB | Refills: 0 | Status: SHIPPED | OUTPATIENT
Start: 2020-07-17 | End: 2020-07-22

## 2020-07-17 RX ORDER — IBUPROFEN 400 MG/1
400 TABLET ORAL
Qty: 50 TAB | Refills: 1 | Status: SHIPPED | OUTPATIENT
Start: 2020-07-17 | End: 2020-12-09

## 2020-07-17 RX ADMIN — OXYCODONE HYDROCHLORIDE AND ACETAMINOPHEN 1 TABLET: 10; 325 TABLET ORAL at 12:13

## 2020-07-17 RX ADMIN — SIMETHICONE 80 MG: 80 TABLET, CHEWABLE ORAL at 12:13

## 2020-07-17 RX ADMIN — IBUPROFEN 400 MG: 400 TABLET, FILM COATED ORAL at 12:13

## 2020-07-17 RX ADMIN — OXYCODONE HYDROCHLORIDE AND ACETAMINOPHEN 1 TABLET: 5; 325 TABLET ORAL at 00:46

## 2020-07-17 RX ADMIN — OXYCODONE HYDROCHLORIDE AND ACETAMINOPHEN 1 TABLET: 5; 325 TABLET ORAL at 04:58

## 2020-07-17 RX ADMIN — IBUPROFEN 400 MG: 400 TABLET, FILM COATED ORAL at 03:53

## 2020-07-17 NOTE — LACTATION NOTE
Early discharge. Mom and baby are going home today. Continue to offer the breast without restriction. Mom's milk should be fully in over the next few days. Reviewed engorgement precautions. Hand Expression has been demoed and written hand-out reviewed. As milk comes in baby will be more alert at the breast and swallows will be more obvious. Breasts may feel softer once baby has finished nursing. Baby should be back to birth weight by 3weeks of age. And then gain on average 1 oz per day for the next 2-3 months. Reviewed babies should be exclusively breastfeeding for the first 6 months and that breastfeeding should continue after introduction of appropriate complimentary foods after 6 months. Initial output should be at least 1 wet and 1 bowel movement for each day old baby is. By day 5-7 once milk is fully in baby will consistently have 6 or more soaking wet diapers and about 4 bowel movement. Some babies have a bowel movement with every feeding and some have 1-3 large bowel movements each day. Inadequate output may indicate inadequate feedings and should be reported to your Pediatrician. Bowel habits may change as baby gets older. Encouraged follow-up at Pediatrician in 1-2 days, no later than 1 week of age. Call [facility] for any questions as needed or to set up an OP visit. OP phone calls are returned within 24 hours. Community Breastfeeding Resource List given.

## 2020-07-17 NOTE — DISCHARGE INSTRUCTIONS
Patient Education         Section: What to Expect at Home  Your Recovery     A  section, or , is surgery to deliver your baby through a cut, called an incision, that the doctor makes in your lower belly and uterus. You may have some pain in your lower belly and need pain medicine for 1 to 2 weeks. You can expect some vaginal bleeding for several weeks. You will probably need about 6 weeks to fully recover. It is important to take it easy while the incision is healing. Avoid heavy lifting, strenuous activities, or exercises that strain the belly muscles while you are recovering. Ask a family member or friend for help with housework, cooking, and shopping. This care sheet gives you a general idea about how long it will take for you to recover. But each person recovers at a different pace. Follow the steps below to get better as quickly as possible. How can you care for yourself at home? Activity  · Rest when you feel tired. Getting enough sleep will help you recover. · Try to walk each day. Start by walking a little more than you did the day before. Bit by bit, increase the amount you walk. Walking boosts blood flow and helps prevent pneumonia, constipation, and blood clots. · Avoid strenuous activities, such as bicycle riding, jogging, weightlifting, and aerobic exercise, for 6 weeks or until your doctor says it is okay. · Until your doctor says it is okay, do not lift anything heavier than your baby. · Do not do sit-ups or other exercises that strain the belly muscles for 6 weeks or until your doctor says it is okay. · Hold a pillow over your incision when you cough or take deep breaths. This will support your belly and decrease your pain. · You may shower as usual. Pat the incision dry when you are done. · You will have some vaginal bleeding. Wear sanitary pads. Do not douche or use tampons until your doctor says it is okay. · Ask your doctor when you can drive again.   · You will probably need to take at least 6 weeks off work. It depends on the type of work you do and how you feel. · Ask your doctor when it is okay for you to have sex. Diet  · You can eat your normal diet. If your stomach is upset, try bland, low-fat foods like plain rice, broiled chicken, toast, and yogurt. · Drink plenty of fluids (unless your doctor tells you not to). · You may notice that your bowel movements are not regular right after your surgery. This is common. Try to avoid constipation and straining with bowel movements. You may want to take a fiber supplement every day. If you have not had a bowel movement after a couple of days, ask your doctor about taking a mild laxative. · If you are breastfeeding, limit alcohol. Alcohol can cause a lack of energy and other health problems for the baby when a breastfeeding woman drinks heavily. It can also get in the way of a mom's ability to feed her baby or to care for the child in other ways. There isn't a lot of research about exactly how much alcohol can harm a baby. Having no alcohol is the safest choice for your baby. If you choose to have a drink now and then, have only one drink, and limit the number of occasions that you have a drink. Wait to breastfeed at least 2 hours after you have a drink to reduce the amount of alcohol the baby may get in the milk. Medicines  · Your doctor will tell you if and when you can restart your medicines. He or she will also give you instructions about taking any new medicines. · If you take aspirin or some other blood thinner, ask your doctor if and when to start taking it again. Make sure that you understand exactly what your doctor wants you to do. · Take pain medicines exactly as directed. ? If the doctor gave you a prescription medicine for pain, take it as prescribed. ? If you are not taking a prescription pain medicine, ask your doctor if you can take an over-the-counter medicine.   · If you think your pain medicine is making you sick to your stomach:  ? Take your medicine after meals (unless your doctor has told you not to). ? Ask your doctor for a different pain medicine. · If your doctor prescribed antibiotics, take them as directed. Do not stop taking them just because you feel better. You need to take the full course of antibiotics. Incision care  · If you have strips of tape on the incision, leave the tape on for a week or until it falls off. · Wash the area daily with warm, soapy water, and pat it dry. Don't use hydrogen peroxide or alcohol, which can slow healing. You may cover the area with a gauze bandage if it weeps or rubs against clothing. Change the bandage every day. · Keep the area clean and dry. Other instructions  · If you breastfeed your baby, you may be more comfortable while you are healing if you place the baby so that he or she is not resting on your belly. Try tucking your baby under your arm, with his or her body along the side you will be feeding on. Support your baby's upper body with your arm. With that hand you can control your baby's head to bring his or her mouth to your breast. This is sometimes called the football hold. Follow-up care is a key part of your treatment and safety. Be sure to make and go to all appointments, and call your doctor if you are having problems. It's also a good idea to know your test results and keep a list of the medicines you take. When should you call for help? Call  911 anytime you think you may need emergency care. For example, call if:  · You have thoughts of harming yourself, your baby, or another person. · You passed out (lost consciousness). · You have chest pain, are short of breath, or cough up blood. · You have a seizure. Call your doctor now or seek immediate medical care if:  · You have pain that does not get better after you take pain medicine. · You have severe vaginal bleeding.   · You are dizzy or lightheaded, or you feel like you may faint.  · You have new or worse pain in your belly or pelvis. · You have loose stitches, or your incision comes open. · You have symptoms of infection, such as:  ? Increased pain, swelling, warmth, or redness. ? Red streaks leading from the incision. ? Pus draining from the incision. ? A fever. · You have symptoms of a blood clot in your leg (called a deep vein thrombosis), such as:  ? Pain in your calf, back of the knee, thigh, or groin. ? Redness and swelling in your leg or groin. · You have signs of preeclampsia, such as:  ? Sudden swelling of your face, hands, or feet. ? New vision problems (such as dimness, blurring, or seeing spots). ? A severe headache. Watch closely for changes in your health, and be sure to contact your doctor if:  · You do not get better as expected. Where can you learn more? Go to http://www.shah.com/  Enter M806 in the search box to learn more about \" Section: What to Expect at Home. \"  Current as of: 2020               Content Version: 12.5  © 2513-5388 [employer]. Care instructions adapted under license by [employer] (which disclaims liability or warranty for this information). If you have questions about a medical condition or this instruction, always ask your healthcare professional. [address] any warranty or liability for your use of this information.

## 2020-07-17 NOTE — LACTATION NOTE
This note was copied from a baby's chart. Mother pumped 5ml of breastmilk given to baby. Supplemented with 10ml of similac pro-advance. Tolerated well.

## 2020-07-17 NOTE — LACTATION NOTE
This note was copied from a baby's chart. Individualized Feeding Plan for Breastfeeding   Lactation Services [phone]      As much as possible, hold your baby on your chest so babys bare skin is against your bare skin with a blanket covering babys back, especially 30 minutes before it is time for baby to eat. Watch for early feeding cues such as, licking lips, sucking motions, rooting, hands to mouth. Crying is a late feeding cue. Feed your baby at least 8 times in 24 hours, or more if your baby is showing feeding cues. If baby is sleepy put baby skin to skin and watch for hunger cues. To rouse baby: unwrap, undress, massage hands, feet, & back, change diaper, gently change babys position from lying to sitting. 15-20 minutes on the first breast of active breastfeeding is considered a good feeding. Good, active breastfeeding is when baby is alert, tugging the nipple, their ear may move, and you can hear swallows. Allow baby to finish the first side before changing sides. Sleeping at the breast or only brief, light sucks should not be considered a good, full breastfeed. At each feeding:  __x__1. Do Suck Practice on finger before each feeding until sucking pattern is smooth. Try using index finger. Nail down towards tongue. __x__2. Hand Express for a few minutes prior to latching to help start milk flow. __x__3. Baby needs to NURSE WELL x 15-20 minutes on at least first breast, burp and offer 2nd breast at every feeding. If no sustained latch only attempt at breast for 10 minutes. Due to weight loss: Need to \"triple feed\"- breastfeed if baby latching well, pump after breastfeeding x 15 minutes, give colostrum plus at least 15ml pumped milk and/or formula:       __x__4. Double pump for 15 minutes with breast massage and compression. Hand express for an additional 2-3 minutes per side. Pump after each feeding attempt or poor feeding, up to 8 times per day.  If you are not putting baby to the breast you need to pump 8 times a day. Pump every 3 hours. __x__5. Give baby all of the breast milk you obtain from pumping first and then supplement formula using a straight syringe or  curved syringe or bottle. AVERAGE INTAKES OF COLOSTRUM BY HEALTHY  INFANTS:  Time  Day Intake (ml per feeding)  Based on 8 feedings per day. 1st 24 hrs  1 2-10 ml  24-48 hrs  2 5-15 ml  48-72 hrs  3 15-30 ml (0.5-1 oz)  72-96 hrs  4 45 ml (1.5oz)                          5-6       60 ml (2oz)                           7         75-90ml (2.5-3oz)    By day 7, baby will need 75-90 ml or 2.5-3 oz at each feeding based on 8 feedings per day & babys weight. (1oz = 30ml). Total milk volume needed in 24 hours by Day 7 is 20-22 oz per day based on baby's birthweight of 8-2. The more often baby eats, the less volume they need per feeding. If baby is eating more often than the minimum of 8 times per day, they may take less per feeding. Comments: If pumping, suggest using olive oil or coconut oil on your nipples before pumping to help reduce the friction. Use feeding plan until follow up with pediatrician. Continue to attempt at the breast for most feeds. Pump every 3 hours if no latch. Give all pumped colostrum/breastmilk at each feeding. OUTPATIENT APPOINTMENT Suggested. Outpatient services are located on the 4th floor at [facility]. Check in at the 4th floor registration desk (the same one you used when you came to have your baby).   Call for questions [phone]

## 2020-07-17 NOTE — PROGRESS NOTES
07/17/20 0046   Pain 1   Pain Scale 1 Numeric (0 - 10)   Pain Intensity 1 4   Patient Stated Pain Goal 3   Pain Location 1 Abdomen; Incisional   Pain Orientation 1 Anterior; Lower   Pain Description 1 Sore   Pain Intervention(s) 1 Medication (see MAR)   Patient Observation   Observations Percocet given for pain   Activity Family/Visitors present; In bed

## 2020-07-17 NOTE — PROGRESS NOTES
07/17/20 0458   Pain 1   Pain Scale 1 Numeric (0 - 10)   Pain Intensity 1 4   Patient Stated Pain Goal 3   Pain Reassessment 1 Yes   Pain Location 1 Incisional   Pain Orientation 1 Anterior; Lower   Pain Description 1 Sore   Pain Intervention(s) 1 Medication (see MAR)   Patient Observation   Observations percocet given for pain   Activity Family/Visitors present; In bed

## 2020-07-17 NOTE — DISCHARGE SUMMARY
[address] [facility] Discharge Summary     Patient ID:  Serafin Locke  [id number]  34 y.o.  1990    Admit date: 7/15/2020    Discharge date and time: No discharge date for patient encounter. Admitting Physician: Pierre Hubbard DO     Discharge Physician: Randell Rodney M.D. Admission Diagnoses: Encounter for elective induction of labor [Z34.90]    Problem List: Hospital - Principal Problem:    Breech presentation (7/15/2020)    Active Problems:    Encounter for elective induction of labor (7/15/2020)     ; Other -   Patient Active Problem List   Diagnosis Code    Rh negative status during pregnancy O26.899, Z67.91    Short interval between pregnancies affecting pregnancy, antepartum O09.899    Pregnancy Z34.90    Breastfeeding (infant) Z78.9    Maternal varicella, non-immune O09.899, Z29.3    Encounter for elective induction of labor Z34.90    Breech presentation O32. 1XX0        Discharge Diagnoses: Encounter for elective induction of labor [Z34.90]    Hospital Course: Serafin Locke had unremarkeable progressive recovery. , Eating, ambulating, and voiding in a routine manner. and Hospital course complicated by C section for breech    Significant Diagnostic Studies: No results found for this or any previous visit (from the past 24 hour(s)). Procedures: primary  section, low transverse incision    Discharge Exam:  Visit Vitals  /65 (BP 1 Location: Left arm, BP Patient Position: At rest)   Pulse 73   Temp 98.6 °F (37 °C)   Resp 18   LMP 10/28/2019 (Approximate)   SpO2 97%   Breastfeeding Unknown        Heart: regular rate and rhythm, S1, S2 normal, no murmur, click, rub or gallop  Lungs:clear to auscultation bilaterally  Extremities: normal, atraumatic, no cyanosis or edema.  No DVT  Incision/episiotomy: no significant drainage, no dehiscence, no significant erythema    Patient Instructions:   Current Discharge Medication List      START taking these medications    Details   ibuprofen (MOTRIN) 400 mg tablet Take 1 Tab by mouth every four (4) hours as needed for Pain. Indications: pain  Qty: 50 Tab, Refills: 1      oxyCODONE-acetaminophen (PERCOCET 10)  mg per tablet Take 1 Tab by mouth every four (4) hours as needed for Pain for up to 5 days. Max Daily Amount: 6 Tabs. Indications: pain  Qty: 20 Tab, Refills: 0    Associated Diagnoses: Encounter for elective induction of labor         CONTINUE these medications which have NOT CHANGED    Details   omeprazole (PRILOSEC OTC) 20 mg tablet Take 20 mg by mouth daily. acetaminophen (TYLENOL EXTRA STRENGTH) 500 mg tablet Take 500 mg by mouth every six (6) hours as needed for Pain.      [id number] nadeem-folic-dha (PRENATAL DHA+COMPLETE PRENATAL) -300 mg-mcg-mg cmpk Take  by mouth. hydrocortisone-pramoxine (ANALPRAM-HC) 2.5-1 % rectal cream Insert  into rectum three (3) times daily. Qty: 30 g, Refills: 2            Activity: physical activity is restricted per discharge instructions  Diet: resume normal diet  Wound Care: Keep wound clean and dry, as directed    Follow-up with [facility] in 2 weeks.     Signed:  Andressa Hanson MD  7/17/2020  8:52 AM

## 2020-07-17 NOTE — PROGRESS NOTES
07/17/20 1214   Pain Assessment   Pain Scale 1 Numeric (0 - 10)   Pain Intensity 1 2   Pain Location 1 Incisional   Pain Description 1 Sore   Pain Intervention(s) 1 Medication (see MAR)   motrin and percocet given. Pt requests large dose of medication prior to discharge.

## 2020-07-17 NOTE — LACTATION NOTE
In to see mom and infant for the first time. Experienced mom stated that infant has been nursing but has been having shorter feeds today and has been sleepeir as well. Reviewed the expectations for the first 24 hours as well as the second night of life. Did inform mom that if infant continued to be sleepy and does not nurse this pm to ask to start pumping. Lactation consultant will follow up tomorrow.

## 2020-07-17 NOTE — PROGRESS NOTES
OB notified of EPDS score (10) via fax.     MELVA Moreno  [facility]   [phone]

## 2020-07-17 NOTE — LACTATION NOTE

## 2020-07-17 NOTE — LACTATION NOTE
Lactation visit. Early discharge desired today. In to check on feeds. Baby now down 10% from birthweight, supplement ordered last night by MD. Mom pumped and syringe fed pumped milk and supplement all night. Mom attempting at the breast now. Baby extremely fussy, not latching. Gave baby 8ml formula via curved syringe and baby settled and had large burp with emesis. Attempted again at the breast, no latch. Short nipples but ailyn with stimulation. Encouraged mom to proceed with pumping now, assisted mom with pumping. Reviewed hands on technique. Milk starting to increase. Pumped 10ml total. Dad fed baby remaining formula supplement while mom pumped. Baby took 15ml formula. Has been spitty. Keep upright post feeds. Gave similac and simiilac sensitive for home use. Reviewed formula versus breastmilk storage guidelines. Feeding plan given and reviewed, copy for home given. Need to keep triple feeding until pediatrician follow up. Attempt at the breast, breastfeed then pump and feed back pumped milk. Add formula to ensure 15ml minimum feeding volume every 3 hours. Parents agreeable. Mom did use nipple shield with first child. Discussed outpatient follow up visit here next week if baby not latching better by then.

## 2020-08-05 ENCOUNTER — TELEPHONE (OUTPATIENT)
Dept: CASE MANAGEMENT | Age: 30
End: 2020-08-05

## 2020-08-05 NOTE — TELEPHONE ENCOUNTER
Phone call to patient at [phone] due to EPDS of 10 after delivery. No answer; message left.     MELVA Son  [place]   [phone]

## 2020-08-12 ENCOUNTER — TELEPHONE (OUTPATIENT)
Dept: CASE MANAGEMENT | Age: 30
End: 2020-08-12

## 2020-08-26 ENCOUNTER — TELEPHONE (OUTPATIENT)
Dept: CASE MANAGEMENT | Age: 30
End: 2020-08-26

## 2020-08-26 NOTE — TELEPHONE ENCOUNTER
Phone call to patient at [phone] due to EPDS of 10 after delivery.     No answer; message left.     Shoaib [address]ina [address]   [phone]

## 2020-11-15 ENCOUNTER — HOSPITAL ENCOUNTER (EMERGENCY)
Age: 30
Discharge: HOME | End: 2020-11-15
Attending: EMERGENCY MEDICINE
Payer: COMMERCIAL

## 2020-11-15 VITALS
RESPIRATION RATE: 16 BRPM | DIASTOLIC BLOOD PRESSURE: 68 MMHG | TEMPERATURE: 98.1 F | SYSTOLIC BLOOD PRESSURE: 115 MMHG | HEIGHT: 65 IN | BODY MASS INDEX: 26.66 KG/M2 | WEIGHT: 160 LBS | OXYGEN SATURATION: 97 % | HEART RATE: 80 BPM

## 2020-11-15 DIAGNOSIS — N93.9 VAGINAL BLEEDING: Primary | ICD-10-CM

## 2020-11-15 LAB
BACTERIA URNS QL MICRO: 0 /HPF
CASTS URNS QL MICRO: 0 /LPF
CRYSTALS URNS QL MICRO: 0 /LPF
EPI CELLS #/AREA URNS HPF: NORMAL /HPF
ERYTHROCYTE [DISTWIDTH] IN BLOOD BY AUTOMATED COUNT: 11.9 % (ref 11.9–14.6)
HCG SERPL-ACNC: 14 MIU/ML (ref 0–6)
HCG UR QL: NEGATIVE
HCT VFR BLD AUTO: 41.5 % (ref 35.8–46.3)
HGB BLD-MCNC: 14.3 G/DL (ref 11.7–15.4)
MCH RBC QN AUTO: 30.4 PG (ref 26.1–32.9)
MCHC RBC AUTO-ENTMCNC: 34.5 G/DL (ref 31.4–35)
MCV RBC AUTO: 88.3 FL (ref 79.6–97.8)
MUCOUS THREADS URNS QL MICRO: 0 /LPF
NRBC # BLD: 0 K/UL (ref 0–0.2)
OTHER OBSERVATIONS,UCOM: NORMAL
PLATELET # BLD AUTO: 278 K/UL (ref 150–450)
PMV BLD AUTO: 9.1 FL (ref 9.4–12.3)
RBC # BLD AUTO: 4.7 M/UL (ref 4.05–5.2)
RBC #/AREA URNS HPF: >100 /HPF
WBC # BLD AUTO: 7.6 K/UL (ref 4.3–11.1)
WBC URNS QL MICRO: NORMAL /HPF

## 2020-11-15 PROCEDURE — 81025 URINE PREGNANCY TEST: CPT

## 2020-11-15 PROCEDURE — 99284 EMERGENCY DEPT VISIT MOD MDM: CPT

## 2020-11-15 PROCEDURE — 84702 CHORIONIC GONADOTROPIN TEST: CPT

## 2020-11-15 PROCEDURE — 81015 MICROSCOPIC EXAM OF URINE: CPT

## 2020-11-15 PROCEDURE — 81003 URINALYSIS AUTO W/O SCOPE: CPT

## 2020-11-15 PROCEDURE — 85027 COMPLETE CBC AUTOMATED: CPT

## 2020-11-16 NOTE — DISCHARGE INSTRUCTIONS
Follow-up with your doctor tomorrow  Maintain good hydration  Return to the ER for any other problems which may arise

## 2020-11-16 NOTE — ED TRIAGE NOTES
Patient presents for c/o vaginal bleeding since last week. Patient had positive pregnancy test last thursday. Last menstrual period was 10/17/2020.  Patient masked on arrival.

## 2020-11-16 NOTE — ED PROVIDER NOTES
51-year-old female presents with complaints of vaginal bleeding x7 days  Bright red and heavier than her normal periods    Patient is status post  section in July  Had her first post pregnancy. In October which was only about 3 days  She had no other bleeding or discharge until the onset of this bleeding episode 1 week ago. She has no pain no dysuria. She has no lightheadedness or dizziness. She did experience some increased nausea, hypersensitive sense of smell and increased hunger. This prompted her to do pregnancy test.  Patient performed to pregnancy test 3 days ago. She reports that both those tests were positive    Called on-call OB GYN tonight due to persistent bleeding and was instructed to come to the ER for evaluation    The history is provided by the patient. Vaginal Bleeding   This is a new problem. The current episode started more than 2 days ago. The problem occurs constantly. The problem has not changed since onset. Pertinent negatives include no chest pain, no abdominal pain, no headaches and no shortness of breath. Nothing aggravates the symptoms. Nothing relieves the symptoms. She has tried nothing for the symptoms. Past Medical History:   Diagnosis Date    Breech presentation 7/15/2020       History reviewed. No pertinent surgical history.       Family History:   Problem Relation Age of Onset    No Known Problems Mother     No Known Problems Father     No Known Problems Maternal Grandmother     Lung Cancer Maternal Grandfather     No Known Problems Paternal Grandmother     Cancer Paternal Grandfather         Type unknown       Social History     Socioeconomic History    Marital status:      Spouse name: Not on file    Number of children: Not on file    Years of education: Not on file    Highest education level: Not on file   Occupational History    Not on file   Social Needs    Financial resource strain: Not on file    Food insecurity     Worry: Not on file     Inability: Not on file    Transportation needs     Medical: Not on file     Non-medical: Not on file   Tobacco Use    Smoking status: Never Smoker    Smokeless tobacco: Never Used   Substance and Sexual Activity    Alcohol use: No    Drug use: No    Sexual activity: Yes     Partners: Male     Birth control/protection: None   Lifestyle    Physical activity     Days per week: Not on file     Minutes per session: Not on file    Stress: Not on file   Relationships    Social connections     Talks on phone: Not on file     Gets together: Not on file     Attends Sikhism service: Not on file     Active member of club or organization: Not on file     Attends meetings of clubs or organizations: Not on file     Relationship status: Not on file    Intimate partner violence     Fear of current or ex partner: Not on file     Emotionally abused: Not on file     Physically abused: Not on file     Forced sexual activity: Not on file   Other Topics Concern     Service Not Asked    Blood Transfusions Not Asked    Caffeine Concern No     Comment: Coffee     Occupational Exposure Not Asked   Jamal Lunch Hazards Not Asked    Sleep Concern Not Asked    Stress Concern Not Asked    Weight Concern Not Asked    Special Diet Not Asked    Back Care Not Asked    Exercise Not Asked    Bike Helmet Not Asked    Seat Belt Yes     Comment: Always    Self-Exams No   Social History Narrative    Denies sexual or physical abuse         ALLERGIES: Sulfa (sulfonamide antibiotics)    Review of Systems   Constitutional: Negative for chills and fever. HENT: Negative for congestion, ear pain and rhinorrhea. Eyes: Negative for photophobia and discharge. Respiratory: Negative for cough and shortness of breath. Cardiovascular: Negative for chest pain and palpitations. Gastrointestinal: Negative for abdominal pain, constipation, diarrhea and vomiting. Endocrine: Negative for cold intolerance and heat intolerance. Genitourinary: Positive for vaginal bleeding. Negative for dysuria and flank pain. Musculoskeletal: Negative for arthralgias, myalgias and neck pain. Skin: Negative for rash and wound. Allergic/Immunologic: Negative for environmental allergies and food allergies. Neurological: Negative for syncope and headaches. Hematological: Negative for adenopathy. Does not bruise/bleed easily. Psychiatric/Behavioral: Negative for dysphoric mood. The patient is not nervous/anxious. All other systems reviewed and are negative. Vitals:    11/15/20 2007 11/15/20 2008 11/15/20 2009   BP: (!) 113/59  (!) 113/59   Pulse:  79    Resp:  16    Temp:  98.1 °F (36.7 °C)    SpO2: 98% 98%    Weight:  72.6 kg (160 lb)    Height:  5' 5\" (1.651 m)             Physical Exam  Vitals signs and nursing note reviewed. Constitutional:       General: She is in acute distress. Appearance: Normal appearance. She is well-developed and normal weight. HENT:      Head: Normocephalic and atraumatic. Right Ear: External ear normal.      Left Ear: External ear normal.      Mouth/Throat:      Pharynx: No oropharyngeal exudate. Eyes:      Extraocular Movements: Extraocular movements intact. Conjunctiva/sclera: Conjunctivae normal.      Pupils: Pupils are equal, round, and reactive to light. Neck:      Musculoskeletal: Normal range of motion and neck supple. Vascular: No JVD. Cardiovascular:      Rate and Rhythm: Normal rate and regular rhythm. Heart sounds: Normal heart sounds. No murmur. No friction rub. No gallop. Pulmonary:      Effort: Pulmonary effort is normal.      Breath sounds: Normal breath sounds. Abdominal:      General: Bowel sounds are normal. There is no distension. Palpations: Abdomen is soft. There is no mass. Tenderness: There is no abdominal tenderness. Musculoskeletal: Normal range of motion. General: No deformity. Skin:     General: Skin is warm and dry. Capillary Refill: Capillary refill takes less than 2 seconds. Findings: No rash. Neurological:      General: No focal deficit present. Mental Status: She is alert and oriented to person, place, and time. Cranial Nerves: No cranial nerve deficit. Sensory: No sensory deficit. Gait: Gait normal.   Psychiatric:         Mood and Affect: Mood normal.         Speech: Speech normal.         Behavior: Behavior normal.         Thought Content: Thought content normal.         Judgment: Judgment normal.          MDM  Number of Diagnoses or Management Options  Vaginal bleeding: new and requires workup  Diagnosis management comments: Check CBC and beta quant    9:14 PM  CBC is normal    Quantitative hCG 14    Patient will be discharged to follow-up with her gynecologist       Amount and/or Complexity of Data Reviewed  Clinical lab tests: ordered and reviewed  Tests in the medicine section of CPT®: reviewed  Review and summarize past medical records: yes    Risk of Complications, Morbidity, and/or Mortality  Presenting problems: moderate  Diagnostic procedures: low  Management options: low  General comments: Elements of this note have been dictated via voice recognition software. Text and phrases may be limited by the accuracy of the software. The chart has been reviewed, but errors may still be present.       Patient Progress  Patient progress: stable         Procedures

## 2020-11-16 NOTE — ED NOTES
I have reviewed discharge instructions with the patient. The patient verbalized understanding. Patient left ED via Discharge Method: ambulatory to Home with self. Opportunity for questions and clarification provided. Patient given 0 scripts. To continue your aftercare when you leave the hospital, you may receive an automated call from our care team to check in on how you are doing. This is a free service and part of our promise to provide the best care and service to meet your aftercare needs.  If you have questions, or wish to unsubscribe from this service please call [phone]. Thank you for Choosing our [facility] Emergency Department.

## 2021-01-20 PROBLEM — A60.09 GENITAL HERPES COMPLICATING PREGNANCY: Status: ACTIVE | Noted: 2021-01-20

## 2021-01-20 PROBLEM — Z78.9 BREASTFEEDING (INFANT): Status: RESOLVED | Noted: 2020-01-02 | Resolved: 2021-01-20

## 2021-01-20 PROBLEM — F32.A ANXIETY AND DEPRESSION: Status: ACTIVE | Noted: 2021-01-20

## 2021-01-20 PROBLEM — Z98.891 HISTORY OF C-SECTION: Status: ACTIVE | Noted: 2021-01-20

## 2021-01-20 PROBLEM — O09.899 MATERNAL VARICELLA, NON-IMMUNE: Status: RESOLVED | Noted: 2020-01-07 | Resolved: 2021-01-20

## 2021-01-20 PROBLEM — F41.9 ANXIETY AND DEPRESSION: Status: ACTIVE | Noted: 2021-01-20

## 2021-01-20 PROBLEM — Z28.39 MATERNAL VARICELLA, NON-IMMUNE: Status: RESOLVED | Noted: 2020-01-07 | Resolved: 2021-01-20

## 2021-01-20 PROBLEM — Z34.90 ENCOUNTER FOR ELECTIVE INDUCTION OF LABOR: Status: RESOLVED | Noted: 2020-07-15 | Resolved: 2021-01-20

## 2021-01-20 PROBLEM — O98.319 GENITAL HERPES COMPLICATING PREGNANCY: Status: ACTIVE | Noted: 2021-01-20

## 2021-01-22 PROBLEM — B95.1 GBS (GROUP B STREPTOCOCCUS) UTI COMPLICATING PREGNANCY: Status: ACTIVE | Noted: 2021-01-22

## 2021-01-22 PROBLEM — O23.40 GBS (GROUP B STREPTOCOCCUS) UTI COMPLICATING PREGNANCY: Status: ACTIVE | Noted: 2021-01-22

## 2021-08-22 ENCOUNTER — ANESTHESIA EVENT (OUTPATIENT)
Dept: LABOR AND DELIVERY | Age: 31
End: 2021-08-22
Payer: COMMERCIAL

## 2021-08-23 ENCOUNTER — ANESTHESIA (OUTPATIENT)
Dept: LABOR AND DELIVERY | Age: 31
End: 2021-08-23
Payer: COMMERCIAL

## 2021-08-23 ENCOUNTER — HOSPITAL ENCOUNTER (INPATIENT)
Age: 31
LOS: 2 days | Discharge: HOME | End: 2021-08-25
Attending: OBSTETRICS & GYNECOLOGY | Admitting: OBSTETRICS & GYNECOLOGY
Payer: COMMERCIAL

## 2021-08-23 PROBLEM — Z3A.39 39 WEEKS GESTATION OF PREGNANCY: Status: ACTIVE | Noted: 2021-08-23

## 2021-08-23 LAB
ABO + RH BLD: NORMAL
BASE EXCESS BLD CALC-SCNC: 1.6 MMOL/L
BASE EXCESS BLD CALC-SCNC: 2 MMOL/L
BLOOD GROUP ANTIBODIES SERPL: NORMAL
ERYTHROCYTE [DISTWIDTH] IN BLOOD BY AUTOMATED COUNT: 13.6 % (ref 11.9–14.6)
HCO3 BLD-SCNC: 30.6 MMOL/L (ref 22–26)
HCO3 BLDV-SCNC: 26.7 MMOL/L (ref 23–28)
HCT VFR BLD AUTO: 38.5 % (ref 35.8–46.3)
HGB BLD-MCNC: 13.2 G/DL (ref 11.7–15.4)
MCH RBC QN AUTO: 31.4 PG (ref 26.1–32.9)
MCHC RBC AUTO-ENTMCNC: 34.3 G/DL (ref 31.4–35)
MCV RBC AUTO: 91.4 FL (ref 79.6–97.8)
NRBC # BLD: 0 K/UL (ref 0–0.2)
PCO2 BLDCO: 41 MMHG (ref 32–68)
PCO2 BLDCO: 65 MMHG (ref 32–68)
PH BLDCO: 7.28 [PH] (ref 7.15–7.38)
PH BLDCO: 7.42 [PH] (ref 7.15–7.38)
PLATELET # BLD AUTO: 214 K/UL (ref 150–450)
PMV BLD AUTO: 10.9 FL (ref 9.4–12.3)
PO2 BLDCO: 13 MMHG
PO2 BLDCO: 29 MMHG
RBC # BLD AUTO: 4.21 M/UL (ref 4.05–5.2)
SAO2 % BLD: 10.9 % (ref 95–98)
SAO2 % BLDV: 55.9 % (ref 65–88)
SERVICE CMNT-IMP: ABNORMAL
SERVICE CMNT-IMP: ABNORMAL
SPECIMEN EXP DATE BLD: NORMAL
SPECIMEN TYPE: ABNORMAL
SPECIMEN TYPE: ABNORMAL
WBC # BLD AUTO: 10.3 K/UL (ref 4.3–11.1)

## 2021-08-23 PROCEDURE — 74011250637 HC RX REV CODE- 250/637: Performed by: ANESTHESIOLOGY

## 2021-08-23 PROCEDURE — 2709999900 HC NON-CHARGEABLE SUPPLY

## 2021-08-23 PROCEDURE — 85027 COMPLETE CBC AUTOMATED: CPT

## 2021-08-23 PROCEDURE — 77030031139 HC SUT VCRL2 J&J -A: Performed by: OBSTETRICS & GYNECOLOGY

## 2021-08-23 PROCEDURE — 77030003665 HC NDL SPN BBMI -A: Performed by: NURSE ANESTHETIST, CERTIFIED REGISTERED

## 2021-08-23 PROCEDURE — 74011250636 HC RX REV CODE- 250/636: Performed by: NURSE ANESTHETIST, CERTIFIED REGISTERED

## 2021-08-23 PROCEDURE — 74011250637 HC RX REV CODE- 250/637: Performed by: OBSTETRICS & GYNECOLOGY

## 2021-08-23 PROCEDURE — 74011250636 HC RX REV CODE- 250/636: Performed by: ANESTHESIOLOGY

## 2021-08-23 PROCEDURE — 77030007880 HC KT SPN EPDRL BBMI -B: Performed by: NURSE ANESTHETIST, CERTIFIED REGISTERED

## 2021-08-23 PROCEDURE — 65270000029 HC RM PRIVATE

## 2021-08-23 PROCEDURE — 82803 BLOOD GASES ANY COMBINATION: CPT

## 2021-08-23 PROCEDURE — 77030032490 HC SLV COMPR SCD KNE COVD -B: Performed by: OBSTETRICS & GYNECOLOGY

## 2021-08-23 PROCEDURE — 74011000250 HC RX REV CODE- 250: Performed by: NURSE ANESTHETIST, CERTIFIED REGISTERED

## 2021-08-23 PROCEDURE — 74011250636 HC RX REV CODE- 250/636: Performed by: OBSTETRICS & GYNECOLOGY

## 2021-08-23 PROCEDURE — 59514 CESAREAN DELIVERY ONLY: CPT | Performed by: OBSTETRICS & GYNECOLOGY

## 2021-08-23 PROCEDURE — 86901 BLOOD TYPING SEROLOGIC RH(D): CPT

## 2021-08-23 PROCEDURE — 77030034696 HC CATH URETH FOL 2W BARD -A: Performed by: OBSTETRICS & GYNECOLOGY

## 2021-08-23 PROCEDURE — 76010000392 HC C SECN EA ADDL 0.5 HR: Performed by: OBSTETRICS & GYNECOLOGY

## 2021-08-23 PROCEDURE — 2709999900 HC NON-CHARGEABLE SUPPLY: Performed by: OBSTETRICS & GYNECOLOGY

## 2021-08-23 PROCEDURE — 74011000250 HC RX REV CODE- 250: Performed by: OBSTETRICS & GYNECOLOGY

## 2021-08-23 PROCEDURE — 74011000250 HC RX REV CODE- 250: Performed by: ANESTHESIOLOGY

## 2021-08-23 PROCEDURE — 75410000003 HC RECOV DEL/VAG/CSECN EA 0.5 HR: Performed by: OBSTETRICS & GYNECOLOGY

## 2021-08-23 PROCEDURE — 77030011943: Performed by: OBSTETRICS & GYNECOLOGY

## 2021-08-23 PROCEDURE — 59025 FETAL NON-STRESS TEST: CPT

## 2021-08-23 PROCEDURE — 76060000078 HC EPIDURAL ANESTHESIA: Performed by: OBSTETRICS & GYNECOLOGY

## 2021-08-23 PROCEDURE — 77030002933 HC SUT MCRYL J&J -A: Performed by: OBSTETRICS & GYNECOLOGY

## 2021-08-23 PROCEDURE — 76010000391 HC C SECN FIRST 1 HR: Performed by: OBSTETRICS & GYNECOLOGY

## 2021-08-23 RX ORDER — SODIUM CHLORIDE 0.9 % (FLUSH) 0.9 %
5-40 SYRINGE (ML) INJECTION AS NEEDED
Status: DISCONTINUED | OUTPATIENT
Start: 2021-08-23 | End: 2021-08-24

## 2021-08-23 RX ORDER — ONDANSETRON 2 MG/ML
4 INJECTION INTRAMUSCULAR; INTRAVENOUS AS NEEDED
Status: ACTIVE | OUTPATIENT
Start: 2021-08-23 | End: 2021-08-24

## 2021-08-23 RX ORDER — SODIUM CHLORIDE 0.9 % (FLUSH) 0.9 %
5-40 SYRINGE (ML) INJECTION AS NEEDED
Status: DISCONTINUED | OUTPATIENT
Start: 2021-08-23 | End: 2021-08-23 | Stop reason: HOSPADM

## 2021-08-23 RX ORDER — SODIUM CHLORIDE 0.9 % (FLUSH) 0.9 %
5-40 SYRINGE (ML) INJECTION EVERY 8 HOURS
Status: DISCONTINUED | OUTPATIENT
Start: 2021-08-23 | End: 2021-08-24

## 2021-08-23 RX ORDER — METOCLOPRAMIDE HYDROCHLORIDE 5 MG/ML
10 INJECTION INTRAMUSCULAR; INTRAVENOUS ONCE
Status: COMPLETED | OUTPATIENT
Start: 2021-08-23 | End: 2021-08-23

## 2021-08-23 RX ORDER — KETOROLAC TROMETHAMINE 30 MG/ML
30 INJECTION, SOLUTION INTRAMUSCULAR; INTRAVENOUS
Status: DISCONTINUED | OUTPATIENT
Start: 2021-08-23 | End: 2021-08-23

## 2021-08-23 RX ORDER — NALOXONE HYDROCHLORIDE 0.4 MG/ML
0.1 INJECTION, SOLUTION INTRAMUSCULAR; INTRAVENOUS; SUBCUTANEOUS
Status: ACTIVE | OUTPATIENT
Start: 2021-08-23 | End: 2021-08-24

## 2021-08-23 RX ORDER — TRISODIUM CITRATE DIHYDRATE AND CITRIC ACID MONOHYDRATE 500; 334 MG/5ML; MG/5ML
30 SOLUTION ORAL ONCE
Status: COMPLETED | OUTPATIENT
Start: 2021-08-23 | End: 2021-08-23

## 2021-08-23 RX ORDER — FAMOTIDINE 20 MG/1
20 TABLET, FILM COATED ORAL DAILY
Status: DISCONTINUED | OUTPATIENT
Start: 2021-08-23 | End: 2021-08-25 | Stop reason: HOSPADM

## 2021-08-23 RX ORDER — SERTRALINE HYDROCHLORIDE 50 MG/1
50 TABLET, FILM COATED ORAL DAILY
Status: DISCONTINUED | OUTPATIENT
Start: 2021-08-24 | End: 2021-08-23

## 2021-08-23 RX ORDER — OXYTOCIN/RINGER'S LACTATE 30/500 ML
10 PLASTIC BAG, INJECTION (ML) INTRAVENOUS AS NEEDED
Status: DISCONTINUED | OUTPATIENT
Start: 2021-08-23 | End: 2021-08-23 | Stop reason: HOSPADM

## 2021-08-23 RX ORDER — SODIUM CHLORIDE, SODIUM LACTATE, POTASSIUM CHLORIDE, CALCIUM CHLORIDE 600; 310; 30; 20 MG/100ML; MG/100ML; MG/100ML; MG/100ML
50 INJECTION, SOLUTION INTRAVENOUS CONTINUOUS
Status: ACTIVE | OUTPATIENT
Start: 2021-08-23 | End: 2021-08-24

## 2021-08-23 RX ORDER — KETOROLAC TROMETHAMINE 10 MG/1
10 TABLET, FILM COATED ORAL
Status: DISCONTINUED | OUTPATIENT
Start: 2021-08-23 | End: 2021-08-24 | Stop reason: ALTCHOICE

## 2021-08-23 RX ORDER — ONDANSETRON 2 MG/ML
INJECTION INTRAMUSCULAR; INTRAVENOUS AS NEEDED
Status: DISCONTINUED | OUTPATIENT
Start: 2021-08-23 | End: 2021-08-23 | Stop reason: HOSPADM

## 2021-08-23 RX ORDER — DIPHENHYDRAMINE HYDROCHLORIDE 50 MG/ML
12.5 INJECTION, SOLUTION INTRAMUSCULAR; INTRAVENOUS
Status: DISCONTINUED | OUTPATIENT
Start: 2021-08-23 | End: 2021-08-23

## 2021-08-23 RX ORDER — MORPHINE SULFATE 10 MG/ML
5 INJECTION, SOLUTION INTRAMUSCULAR; INTRAVENOUS
Status: DISCONTINUED | OUTPATIENT
Start: 2021-08-23 | End: 2021-08-24 | Stop reason: SDUPTHER

## 2021-08-23 RX ORDER — OXYCODONE HYDROCHLORIDE 5 MG/1
10 TABLET ORAL
Status: DISCONTINUED | OUTPATIENT
Start: 2021-08-23 | End: 2021-08-24 | Stop reason: SDUPTHER

## 2021-08-23 RX ORDER — HYDROMORPHONE HYDROCHLORIDE 1 MG/ML
1 INJECTION, SOLUTION INTRAMUSCULAR; INTRAVENOUS; SUBCUTANEOUS
Status: ACTIVE | OUTPATIENT
Start: 2021-08-23 | End: 2021-08-24

## 2021-08-23 RX ORDER — MORPHINE SULFATE 0.5 MG/ML
INJECTION, SOLUTION EPIDURAL; INTRATHECAL; INTRAVENOUS
Status: COMPLETED | OUTPATIENT
Start: 2021-08-23 | End: 2021-08-23

## 2021-08-23 RX ORDER — SODIUM CHLORIDE 0.9 % (FLUSH) 0.9 %
5-40 SYRINGE (ML) INJECTION EVERY 8 HOURS
Status: DISCONTINUED | OUTPATIENT
Start: 2021-08-23 | End: 2021-08-23 | Stop reason: HOSPADM

## 2021-08-23 RX ORDER — ACETAMINOPHEN 500 MG
1000 TABLET ORAL
Status: COMPLETED | OUTPATIENT
Start: 2021-08-23 | End: 2021-08-24

## 2021-08-23 RX ORDER — SODIUM CHLORIDE, SODIUM LACTATE, POTASSIUM CHLORIDE, CALCIUM CHLORIDE 600; 310; 30; 20 MG/100ML; MG/100ML; MG/100ML; MG/100ML
75 INJECTION, SOLUTION INTRAVENOUS CONTINUOUS
Status: DISCONTINUED | OUTPATIENT
Start: 2021-08-23 | End: 2021-08-24

## 2021-08-23 RX ORDER — BUPIVACAINE HYDROCHLORIDE 7.5 MG/ML
INJECTION, SOLUTION INTRASPINAL
Status: COMPLETED | OUTPATIENT
Start: 2021-08-23 | End: 2021-08-23

## 2021-08-23 RX ORDER — NALBUPHINE HYDROCHLORIDE 10 MG/ML
5 INJECTION, SOLUTION INTRAMUSCULAR; INTRAVENOUS; SUBCUTANEOUS
Status: DISPENSED | OUTPATIENT
Start: 2021-08-23 | End: 2021-08-24

## 2021-08-23 RX ORDER — SERTRALINE HYDROCHLORIDE 100 MG/1
100 TABLET, FILM COATED ORAL DAILY
Status: DISCONTINUED | OUTPATIENT
Start: 2021-08-24 | End: 2021-08-25 | Stop reason: HOSPADM

## 2021-08-23 RX ORDER — OXYCODONE HYDROCHLORIDE 5 MG/1
10 TABLET ORAL
Status: ACTIVE | OUTPATIENT
Start: 2021-08-23 | End: 2021-08-24

## 2021-08-23 RX ORDER — OXYTOCIN/RINGER'S LACTATE 30/500 ML
87.3 PLASTIC BAG, INJECTION (ML) INTRAVENOUS AS NEEDED
Status: DISCONTINUED | OUTPATIENT
Start: 2021-08-23 | End: 2021-08-23

## 2021-08-23 RX ORDER — CEFAZOLIN SODIUM/WATER 2 G/20 ML
2 SYRINGE (ML) INTRAVENOUS ONCE
Status: COMPLETED | OUTPATIENT
Start: 2021-08-23 | End: 2021-08-23

## 2021-08-23 RX ORDER — OXYTOCIN/RINGER'S LACTATE 30/500 ML
10 PLASTIC BAG, INJECTION (ML) INTRAVENOUS AS NEEDED
Status: DISCONTINUED | OUTPATIENT
Start: 2021-08-23 | End: 2021-08-23

## 2021-08-23 RX ORDER — OXYTOCIN/RINGER'S LACTATE 30/500 ML
87.3 PLASTIC BAG, INJECTION (ML) INTRAVENOUS AS NEEDED
Status: DISCONTINUED | OUTPATIENT
Start: 2021-08-23 | End: 2021-08-23 | Stop reason: HOSPADM

## 2021-08-23 RX ORDER — OXYTOCIN/RINGER'S LACTATE 30/500 ML
PLASTIC BAG, INJECTION (ML) INTRAVENOUS
Status: DISCONTINUED | OUTPATIENT
Start: 2021-08-23 | End: 2021-08-23 | Stop reason: HOSPADM

## 2021-08-23 RX ORDER — DIPHENHYDRAMINE HCL 25 MG
25 CAPSULE ORAL
Status: DISCONTINUED | OUTPATIENT
Start: 2021-08-23 | End: 2021-08-25 | Stop reason: HOSPADM

## 2021-08-23 RX ORDER — DEXTROSE, SODIUM CHLORIDE, SODIUM LACTATE, POTASSIUM CHLORIDE, AND CALCIUM CHLORIDE 5; .6; .31; .03; .02 G/100ML; G/100ML; G/100ML; G/100ML; G/100ML
125 INJECTION, SOLUTION INTRAVENOUS CONTINUOUS
Status: DISCONTINUED | OUTPATIENT
Start: 2021-08-23 | End: 2021-08-23 | Stop reason: HOSPADM

## 2021-08-23 RX ORDER — SODIUM CHLORIDE, SODIUM LACTATE, POTASSIUM CHLORIDE, CALCIUM CHLORIDE 600; 310; 30; 20 MG/100ML; MG/100ML; MG/100ML; MG/100ML
INJECTION, SOLUTION INTRAVENOUS
Status: DISCONTINUED | OUTPATIENT
Start: 2021-08-23 | End: 2021-08-23 | Stop reason: HOSPADM

## 2021-08-23 RX ORDER — KETOROLAC TROMETHAMINE 30 MG/ML
INJECTION, SOLUTION INTRAMUSCULAR; INTRAVENOUS AS NEEDED
Status: DISCONTINUED | OUTPATIENT
Start: 2021-08-23 | End: 2021-08-23 | Stop reason: HOSPADM

## 2021-08-23 RX ORDER — EPHEDRINE SULFATE/0.9% NACL/PF 50 MG/5 ML
SYRINGE (ML) INTRAVENOUS AS NEEDED
Status: DISCONTINUED | OUTPATIENT
Start: 2021-08-23 | End: 2021-08-23 | Stop reason: HOSPADM

## 2021-08-23 RX ORDER — FENTANYL CITRATE 50 UG/ML
INJECTION, SOLUTION INTRAMUSCULAR; INTRAVENOUS AS NEEDED
Status: DISCONTINUED | OUTPATIENT
Start: 2021-08-23 | End: 2021-08-23 | Stop reason: HOSPADM

## 2021-08-23 RX ADMIN — MORPHINE SULFATE 0.15 MG: 0.5 INJECTION, SOLUTION EPIDURAL; INTRATHECAL; INTRAVENOUS at 09:22

## 2021-08-23 RX ADMIN — FENTANYL CITRATE 50 MCG: 50 INJECTION INTRAMUSCULAR; INTRAVENOUS at 09:59

## 2021-08-23 RX ADMIN — SODIUM CHLORIDE, SODIUM LACTATE, POTASSIUM CHLORIDE, AND CALCIUM CHLORIDE 1000 ML: 600; 310; 30; 20 INJECTION, SOLUTION INTRAVENOUS at 08:36

## 2021-08-23 RX ADMIN — MORPHINE SULFATE 4.85 MG: 0.5 INJECTION, SOLUTION EPIDURAL; INTRATHECAL; INTRAVENOUS at 09:52

## 2021-08-23 RX ADMIN — SODIUM CITRATE AND CITRIC ACID MONOHYDRATE 30 ML: 500; 334 SOLUTION ORAL at 09:04

## 2021-08-23 RX ADMIN — CEFAZOLIN SODIUM 2 G: 100 INJECTION, POWDER, LYOPHILIZED, FOR SOLUTION INTRAVENOUS at 09:06

## 2021-08-23 RX ADMIN — BUPIVACAINE HYDROCHLORIDE IN DEXTROSE 10 MG: 7.5 INJECTION, SOLUTION SUBARACHNOID at 09:22

## 2021-08-23 RX ADMIN — NALBUPHINE HYDROCHLORIDE 5 MG: 10 INJECTION, SOLUTION INTRAMUSCULAR; INTRAVENOUS; SUBCUTANEOUS at 11:27

## 2021-08-23 RX ADMIN — KETOROLAC TROMETHAMINE 30 MG: 30 INJECTION, SOLUTION INTRAMUSCULAR; INTRAVENOUS at 10:06

## 2021-08-23 RX ADMIN — KETOROLAC TROMETHAMINE 10 MG: 10 TABLET, FILM COATED ORAL at 22:17

## 2021-08-23 RX ADMIN — PHENYLEPHRINE HYDROCHLORIDE 50 MCG: 10 INJECTION INTRAVENOUS at 09:25

## 2021-08-23 RX ADMIN — Medication 10 MG: at 09:45

## 2021-08-23 RX ADMIN — ACETAMINOPHEN 1000 MG: 500 TABLET, FILM COATED ORAL at 11:27

## 2021-08-23 RX ADMIN — Medication 10 ML: at 18:30

## 2021-08-23 RX ADMIN — ONDANSETRON 4 MG: 2 INJECTION INTRAMUSCULAR; INTRAVENOUS at 09:21

## 2021-08-23 RX ADMIN — Medication 10 MG: at 09:37

## 2021-08-23 RX ADMIN — KETOROLAC TROMETHAMINE 30 MG: 30 INJECTION, SOLUTION INTRAMUSCULAR at 15:52

## 2021-08-23 RX ADMIN — FENTANYL CITRATE 50 MCG: 50 INJECTION INTRAMUSCULAR; INTRAVENOUS at 10:05

## 2021-08-23 RX ADMIN — ACETAMINOPHEN 1000 MG: 500 TABLET, FILM COATED ORAL at 18:29

## 2021-08-23 RX ADMIN — NALBUPHINE HYDROCHLORIDE 5 MG: 10 INJECTION, SOLUTION INTRAMUSCULAR; INTRAVENOUS; SUBCUTANEOUS at 18:29

## 2021-08-23 RX ADMIN — SODIUM CHLORIDE, SODIUM LACTATE, POTASSIUM CHLORIDE, AND CALCIUM CHLORIDE: 600; 310; 30; 20 INJECTION, SOLUTION INTRAVENOUS at 09:15

## 2021-08-23 RX ADMIN — DIPHENHYDRAMINE HYDROCHLORIDE 25 MG: 25 CAPSULE ORAL at 20:23

## 2021-08-23 RX ADMIN — Medication 10 MG: at 09:40

## 2021-08-23 RX ADMIN — FAMOTIDINE 20 MG: 20 TABLET, FILM COATED ORAL at 19:46

## 2021-08-23 RX ADMIN — METOCLOPRAMIDE 10 MG: 5 INJECTION, SOLUTION INTRAMUSCULAR; INTRAVENOUS at 09:04

## 2021-08-23 RX ADMIN — OXYTOCIN 500 ML/HR: 10 INJECTION, SOLUTION INTRAMUSCULAR; INTRAVENOUS at 09:42

## 2021-08-23 NOTE — H&P
History & Physical    Name: Jaclyn Valles MRN: [id number]  SSN: [id number]    YOB: 1990  Age: 31 y.o. Sex: female      Subjective:     Estimated Date of Delivery: 21  OB History    Para Term  AB Living   4 2 2 0 1 2   SAB TAB Ectopic Molar Multiple Live Births   1 0 0   0 2      # Outcome Date GA Lbr Harry/2nd Weight Sex Delivery Anes PTL Lv   4 Current            3 Term 07/15/20 40w5d  8 lb 1.8 oz (3.68 kg) F CS-LTranv Spinal Y JE   2 Term 19 40w3d 10:12 / 01:07 7 lb 1.4 oz (3.215 kg) F Vag-Spont EPI N JE   1 SAB                Ms. Melisa Knox admitted with pregnancy at 39w0d for  section due to previous  section. Prenatal course was normal. Please see prenatal records for details. Past Medical History:   Diagnosis Date    Breech presentation 7/15/2020    Genital herpes complicating pregnancy     Post partum depression 2020     Past Surgical History:   Procedure Laterality Date    HX  SECTION       Social History     Occupational History    Not on file   Tobacco Use    Smoking status: Never Smoker    Smokeless tobacco: Never Used   Substance and Sexual Activity    Alcohol use: No    Drug use: No    Sexual activity: Yes     Partners: Male     Birth control/protection: None     Family History   Problem Relation Age of Onset    No Known Problems Mother     No Known Problems Father     No Known Problems Maternal Grandmother     Lung Cancer Maternal Grandfather     No Known Problems Paternal Grandmother     Cancer Paternal Grandfather         Type unknown       Allergies   Allergen Reactions    Sulfa (Sulfonamide Antibiotics) Unknown (comments)     As a child     Prior to Admission medications    Medication Sig Start Date End Date Taking? Authorizing Provider   omeprazole magnesium (PRILOSEC PO) Take  by mouth. Yes Provider, Historical   sertraline (Zoloft) 50 mg tablet Take  by mouth daily.    Yes Provider, Historical [id number] nadeem-folic-dha (PRENATAL DHA+COMPLETE PRENATAL) -300 mg-mcg-mg cmpk Take  by mouth. Yes Provider, Historical   pyridoxine, vitamin B6, (VITAMIN B-6) 25 mg tablet Take 25 mg by mouth three (3) times daily. Patient not taking: Reported on 2021    Provider, Historical   doxylamine succinate (UNISOM) 25 mg tablet Take 25 mg by mouth nightly as needed. Patient not taking: Reported on 2021    Provider, Historical        Review of Systems: A comprehensive review of systems was negative except for that written in the History of Present Illness. Objective:     Vitals:  Vitals:    21 0740   BP: 129/77   Pulse: (!) 102   Resp: 16   Temp: 98.7 °F (37.1 °C)   Weight: 208 lb (94.3 kg)   Height: 5' 5\" (1.651 m)        Physical Exam:  Patient without distress. Heart: Regular rate and rhythm  Lung: clear to auscultation throughout lung fields, no wheezes, no rales, no rhonchi and normal respiratory effort  Membranes:  Intact  Fetal Heart Rate: Reactive    Prenatal Labs:   Lab Results   Component Value Date/Time    ABO/Rh(D) AB NEGATIVE 2021 08:21 AM    Rubella, External Immune 2021 12:00 AM    GrBStrep, External + urine 2021 12:00 AM    HBsAg, External Negative 2021 12:00 AM    HIV, External Non-Reactive 2021 12:00 AM    RPR, External Non-Reactive 2021 12:00 AM    Gonorrhea, External negative 02/15/2021 12:00 AM    Chlamydia, External negative 02/15/2021 12:00 AM    ABO,Rh AB negative 2021 12:00 AM         Impression/Plan:     Plan:  Admit for  section. Group B Strep was positive, use of prophylactic antibiotics not indicated. Discussed the risks of surgery including the risks of bleeding, infection, deep vein thrombosis, and surgical injuries to internal organs including but not limited to the bowels, bladder, rectum, and female reproductive organs.  The patient understands the risks; any and all questions were answered to the patient's satisfaction.

## 2021-08-23 NOTE — L&D DELIVERY NOTE
Delivery Summary    Patient: Cameron Streeter MRN: [id number]  SSN: [id number]    YOB: 1990  Age: 31 y.o. Sex: female        Information for the patient's :  Harini Yeager [[id number]]       Labor Events:    Labor: No    Steroids: None   Cervical Ripening Date/Time:       Cervical Ripening Type: None   Antibiotics During Labor: No   Rupture Identifier: Sac 1    Rupture Date/Time: 2021 9:41 AM   Rupture Type: AROM   Amniotic Fluid Volume: Moderate    Amniotic Fluid Description: Clear    Amniotic Fluid Odor: None    Induction: None       Induction Date/Time:        Indications for Induction:      Augmentation: None   Augmentation Date/Time:      Indications for Augmentation:     Labor complications: None       Additional complications:        Delivery Events:  Indications For Episiotomy:     Episiotomy: None   Perineal Laceration(s):     Repaired:     Periurethral Laceration Location:      Repaired:     Labial Laceration Location:     Repaired:     Sulcal Laceration Location:     Repaired:     Vaginal Laceration Location:     Repaired:     Cervical Laceration Location:     Repaired:     Repair Suture:     Number of Repair Packets:     Estimated Blood Loss (ml):  ml   Quantitaive Blood Loss (ml):             Delivery Date: 2021    Delivery Time: 9:41 AM   Delivery Type: , Low Transverse     Details    Trial of Labor: No   Primary/Repeat: Repeat   Priority: Routine   Indications:  Prior Uterine Surgery       Sex:  Female     Gestational Age: 39w0d  Delivery Clinician: Jaye Nicholas  Living Status: Living   Delivery Location: OR OR 2          APGARS  One minute Five minutes Ten minutes   Skin color: 0   1        Heart rate: 2   2        Grimace: 2   2        Muscle tone: 2   2        Breathin   2        Totals: 8   9          Presentation: Vertex    Position: Left Occiput Transverse  Resuscitation Method:  Suctioning-bulb; Tactile Stimulation     Meconium Stained: None      Cord Information: 3 Vessels  Complications: None  Cord around:    Delayed cord clamping? No  Cord clamped date/time:   Disposition of Cord Blood: Lab    Blood Gases Sent?: Yes    Placenta:  Date/Time: 2021  9:43 AM  Removal: Expressed      Appearance: Normal;Intact     [place] Measurements:  Birth Weight: 8 lb 8.5 oz (3.87 kg)      Birth Length: 1' 8.08\" (0.51 m)      Head Circumference: 1' 2.17\" (0.36 m)      Chest Circumference: 1' 1.98\" (0.355 m)     Abdominal Girth:       Other Providers:   LUANN ABREU;DANIELA ZAIDI;ROB GRIFFITH;CHELSEA DRAKE;BRANDEN CAMEJO;AMILCAR SHELTON;FLAVIA BLOCK, Obstetrician;Primary Nurse;Primary  Nurse;Neonatologist;Anesthesiologist;Crna;Scrub Tech             Group B Strep:   Lab Results   Component Value Date/Time    GrBStrep, External + urine 2021 12:00 AM     Information for the patient's :  Tristan Paz [[id number]]   No results found for: ABORHWyman Saran, PCTDIG, BILI, ABORHEXT, ABORH     Recent Labs     21  1000 21  0959   PCO2CB 41 65   PO2CB 29 13   HCO3I  --  30.6*   SO2I  --  10.9*   SPECTI VENOUS CORD ARTERIAL CORD   PHICB 7.42* 7.28

## 2021-08-23 NOTE — PROGRESS NOTES
Pt to L&D for scheduled Repeat  SEction delivery. Pt plced on EFM, IV started, assessment started and consents signed.

## 2021-08-23 NOTE — LACTATION NOTE
This note was copied from a baby's chart. In to see mom and infant for the first time. Experienced mom stated that infant has latched and nursed well since birth. She said that she is actually her best to nurse so far. Reviewed expectations of the first 24 hours and he second night. Lactation consultant will follow up tomorrow.

## 2021-08-23 NOTE — LACTATION NOTE

## 2021-08-23 NOTE — PROGRESS NOTES
Notified Dr. Sonya Gan of patient home med of zoloft 100mg po once a day and ordered was zoloft 50 mg, states may increase to zoloft 100 mg po once a day

## 2021-08-23 NOTE — PROGRESS NOTES
SBAR OUT Report: Mother    Verbal report given to Shira Leach RN (full name & credentials) on this patient, who is now being transferred to MIU (unit) for routine progression of care. The patient is wearing a green \"Anesthesia-Duramorph\" band. Report consisted of patient's Situation, Background, Assessment and Recommendations (SBAR). Shanks ID bands were compared with the identification form, and verified with the patient and receiving nurse. Information from the SBAR and the [facility] [facility] Report was reviewed with the receiving nurse; opportunity for questions and clarification provided.

## 2021-08-23 NOTE — ROUTINE PROCESS
SBAR IN Report: Mother    Verbal report received from John Carr on this patient, who is now being transferred from L&D for routine post - op. The patient is wearing a green \"Anesthesia-Duramorph\" band. Report consisted of patient's Situation, Background, Assessment and Recommendations (SBAR).  ID bands were compared with the identification form, and verified with the patient and transferring nurse. Information from the SBAR, Intake/Output, MAR and Recent Results and the [place] Report was reviewed with the transferring nurse; opportunity for questions and clarification provided.

## 2021-08-23 NOTE — OP NOTES
INTRAUTERINE PREGNANCY  SECTION     Netta Singh  [id number]    DATE OF PROCEDURE:  2021    PREOPERATIVE DIAGNOSIS:  History of  [T85.812]    POSTOPERATIVE DIAGNOSIS:  Same as preoperative diagnosis      with operative delivery of a 3870 g female with Apgars of 8 and 9. ADDITIONAL DIAGNOSES: Very thin lower uterine segment    PROCEDURE: Intrauterine pregnancy,  section low transverse    SURGEON:  Rosalina Armenta MD    ASSISTANT:  none    ANESTHESIA: Spinal    QBL: 375 ml    SPECIMENS:   ID Type Source Tests Collected by Time Destination   1 :  Placenta   Wadell Boxer, MD 2021 0801 Discarded        COMPLICATIONS: None    OPERATIVE PROCEDURE: Patient was placed on the operating room table in the supine position/left lateral tilt, marshall catheter in place, pneumatic compression hose on and operating. Time out was done to confirm the operating procedure, surgeon, patient and site. Once confirmed by the team, procedure was started. The patient was prepped and draped in the usual fashion for abdominal surgery. Adequate anesthesia was confirmed, A Pfannenstiel incision was made removing the old scar and carried down sharply through the skin, subcutaneous tissue, and fascia. Fascia was sharply dissected free from underlying rectus muscles. The peritoneum was sharply entered and extended vertically with good visualization of bowel and bladder. The bladder blade was then placed over the bladder. The visceroperitoneal reflection over the lower uterine segment was incised transversely and the bladder flap sharply and bluntly developed. The bladder blade was reinserted in this space. A low transverse hysterotomy incision was made with return of clear fluid then extended bluntly, and the infants head was delivered from the pelvis with gentle fundal pressure. The cord was clamped and cut. Mouth and nose were suctioned clean.  The infant was given to the [facility] personel present at the time of delivery. The placenta was delivered with fundal massage. The uterus was exteriorized, wrapped in a wet lap square, curetted with a dry square, and closed in a double-layered fashion with 0-vicryl. Hemostasis appeared adequate after figure-of-eight in the right angle. The cul-de-sac was then irrigated and suctioned clean. Bladder flap was irrigated. The uterus was replaced in the abdomen. The gutters were irrigated and suctioned clean. The hysterotomy incision was noted to be hemostatic. The peritoneum was closed with 3-0 vicryl and rectus muscle reapproximated with 0-vicryl. The subfascial layer made hemostatic with electrocautery. Fascia closed with # 0 PDS unning. Subcutaneous tissue irrigated, noted to be hemostatic and reapproximated with 3-0 vicryl. Skin then closed with 4-0 monocryl in a subcuticular fashion. All sponge, lap, instrument, and needle counts correct times two. The patient tolerated the procedure well and went back to her room in satisfactory condition. Infant was with the mother.

## 2021-08-23 NOTE — ANESTHESIA PREPROCEDURE EVALUATION
Relevant Problems   No relevant active problems       Anesthetic History   No history of anesthetic complications       Comments: Hx of N/V at beginning of last C/S-after SAB     Review of Systems / Medical History  Patient summary reviewed and pertinent labs reviewed    Pulmonary  Within defined limits                 Neuro/Psych   Within defined limits          Comments: Hx of anxiety/depression Cardiovascular                  Exercise tolerance: >4 METS     GI/Hepatic/Renal  Within defined limits              Endo/Other  Within defined limits           Other Findings   Comments: Breech presentation  genital herpes         Physical Exam    Airway  Mallampati: II  TM Distance: > 6 cm  Neck ROM: normal range of motion   Mouth opening: Normal     Cardiovascular    Rhythm: regular           Dental  No notable dental hx       Pulmonary                 Abdominal  GI exam deferred       Other Findings            Anesthetic Plan    ASA: 2  Anesthesia type: spinal      Post-op pain plan if not by surgeon: intrathecal opiates      Anesthetic plan and risks discussed with: Patient

## 2021-08-23 NOTE — ANESTHESIA POSTPROCEDURE EVALUATION
Procedure(s):   SECTION.     spinal    Anesthesia Post Evaluation      Multimodal analgesia: multimodal analgesia used between 6 hours prior to anesthesia start to PACU discharge  Patient location during evaluation: PACU  Patient participation: complete - patient participated  Level of consciousness: awake  Pain management: adequate  Airway patency: patent  Anesthetic complications: no  Cardiovascular status: acceptable  Respiratory status: acceptable  Hydration status: acceptable  Post anesthesia nausea and vomiting:  none  Final Post Anesthesia Temperature Assessment:  Normothermia (36.0-37.5 degrees C)      INITIAL Post-op Vital signs:   Vitals Value Taken Time   /53 21 1125   Temp 36.7 °C (98 °F) 21 1025   Pulse 80 21 1125   Resp 18 21 1125   SpO2 96 % 21 1110

## 2021-08-23 NOTE — ANESTHESIA PROCEDURE NOTES
Spinal Block    Start time: 8/23/2021 9:19 AM  End time: 8/23/2021 9:22 AM  Performed by: Mary Jo George MD  Authorized by: Mary Jo George MD     Pre-procedure:  Preanesthetic Checklist: patient identified, risks and benefits discussed, anesthesia consent, site marked, patient being monitored and timeout performed    Timeout Time: 09:19 EDT          Spinal Block:   Patient Position:  Seated  Prep Region:  Lumbar  Prep: chlorhexidine and patient draped      Location:  L2-3  Technique:  Single shot        Needle:   Needle Type:   Karen  Needle Gauge:  25 G  Attempts:  1      Events: CSF confirmed and no blood with aspiration        Assessment:  Insertion:  Uncomplicated  Patient tolerance:  Patient tolerated the procedure well with no immediate complications

## 2021-08-23 NOTE — PROGRESS NOTES
Admission assessment complete as noted. Patient oriented to room and unit. Plan of care reviewed and patient verbalizes understanding. Questions encouraged and answered. Patent encouraged to call for needs or concerns. Safety Teaching reviewed:   1. Hand hygiene prior to handling the infant. 2. Use of bulb syringe. 3. Bracelets with matching numbers are placed on mother and infant  4. An infant security tag  [facility]) is placed on the infant's ankle and monitored  5. All OB nurses wear pink Employee badges - do not give your baby to anyone without proper identification. 6. Never leave the baby alone in the room. 7. The infant should be placed on their back to sleep. on a firm mattress. No toys should be placed in the crib. (safe sleep video offered to view)  8. Never shake the baby (video offered to view)  9. Infant fall prevention - do not sleep with the baby, and place the baby in the crib while ambulating. 8. Mother and Baby Care booklet given to Mother.

## 2021-08-24 LAB
BLOOD BANK CMNT PATIENT-IMP: NORMAL
FETAL SCREEN,FMHS: NORMAL
HCT VFR BLD AUTO: 35.9 % (ref 35.8–46.3)
HGB BLD-MCNC: 12 G/DL (ref 11.7–15.4)

## 2021-08-24 PROCEDURE — 74011250637 HC RX REV CODE- 250/637: Performed by: ANESTHESIOLOGY

## 2021-08-24 PROCEDURE — 74011250636 HC RX REV CODE- 250/636: Performed by: OBSTETRICS & GYNECOLOGY

## 2021-08-24 PROCEDURE — 2709999900 HC NON-CHARGEABLE SUPPLY

## 2021-08-24 PROCEDURE — 74011250637 HC RX REV CODE- 250/637: Performed by: OBSTETRICS & GYNECOLOGY

## 2021-08-24 PROCEDURE — 36415 COLL VENOUS BLD VENIPUNCTURE: CPT

## 2021-08-24 PROCEDURE — 85014 HEMATOCRIT: CPT

## 2021-08-24 PROCEDURE — 65270000029 HC RM PRIVATE

## 2021-08-24 PROCEDURE — 85461 HEMOGLOBIN FETAL: CPT

## 2021-08-24 RX ORDER — IBUPROFEN 800 MG/1
800 TABLET ORAL EVERY 6 HOURS
Status: DISCONTINUED | OUTPATIENT
Start: 2021-08-24 | End: 2021-08-25 | Stop reason: HOSPADM

## 2021-08-24 RX ORDER — OXYCODONE HYDROCHLORIDE 5 MG/1
10 TABLET ORAL
Status: DISCONTINUED | OUTPATIENT
Start: 2021-08-24 | End: 2021-08-25 | Stop reason: HOSPADM

## 2021-08-24 RX ORDER — ACETAMINOPHEN 325 MG/1
650 TABLET ORAL
Status: DISCONTINUED | OUTPATIENT
Start: 2021-08-24 | End: 2021-08-25 | Stop reason: HOSPADM

## 2021-08-24 RX ORDER — SODIUM CHLORIDE 0.9 % (FLUSH) 0.9 %
5-40 SYRINGE (ML) INJECTION EVERY 8 HOURS
Status: DISCONTINUED | OUTPATIENT
Start: 2021-08-24 | End: 2021-08-25 | Stop reason: ALTCHOICE

## 2021-08-24 RX ORDER — DOCUSATE SODIUM 100 MG/1
100 CAPSULE, LIQUID FILLED ORAL 2 TIMES DAILY
Status: DISCONTINUED | OUTPATIENT
Start: 2021-08-24 | End: 2021-08-25 | Stop reason: HOSPADM

## 2021-08-24 RX ORDER — SODIUM CHLORIDE, SODIUM LACTATE, POTASSIUM CHLORIDE, CALCIUM CHLORIDE 600; 310; 30; 20 MG/100ML; MG/100ML; MG/100ML; MG/100ML
100 INJECTION, SOLUTION INTRAVENOUS CONTINUOUS
Status: DISCONTINUED | OUTPATIENT
Start: 2021-08-24 | End: 2021-08-25 | Stop reason: ALTCHOICE

## 2021-08-24 RX ORDER — SIMETHICONE 80 MG
80 TABLET,CHEWABLE ORAL
Status: DISCONTINUED | OUTPATIENT
Start: 2021-08-24 | End: 2021-08-25 | Stop reason: HOSPADM

## 2021-08-24 RX ORDER — OXYCODONE HYDROCHLORIDE 5 MG/1
5 TABLET ORAL
Status: DISCONTINUED | OUTPATIENT
Start: 2021-08-24 | End: 2021-08-25 | Stop reason: HOSPADM

## 2021-08-24 RX ORDER — SODIUM CHLORIDE 0.9 % (FLUSH) 0.9 %
5-40 SYRINGE (ML) INJECTION AS NEEDED
Status: DISCONTINUED | OUTPATIENT
Start: 2021-08-24 | End: 2021-08-25 | Stop reason: ALTCHOICE

## 2021-08-24 RX ORDER — ONDANSETRON 2 MG/ML
4 INJECTION INTRAMUSCULAR; INTRAVENOUS
Status: DISCONTINUED | OUTPATIENT
Start: 2021-08-24 | End: 2021-08-25 | Stop reason: ALTCHOICE

## 2021-08-24 RX ADMIN — SERTRALINE HYDROCHLORIDE 100 MG: 100 TABLET ORAL at 10:15

## 2021-08-24 RX ADMIN — DOCUSATE SODIUM 100 MG: 100 CAPSULE, LIQUID FILLED ORAL at 12:27

## 2021-08-24 RX ADMIN — ACETAMINOPHEN 1000 MG: 500 TABLET, FILM COATED ORAL at 10:15

## 2021-08-24 RX ADMIN — IBUPROFEN 800 MG: 800 TABLET, FILM COATED ORAL at 12:27

## 2021-08-24 RX ADMIN — SIMETHICONE 80 MG: 80 TABLET, CHEWABLE ORAL at 22:06

## 2021-08-24 RX ADMIN — OXYCODONE 5 MG: 5 TABLET ORAL at 18:39

## 2021-08-24 RX ADMIN — DOCUSATE SODIUM 100 MG: 100 CAPSULE, LIQUID FILLED ORAL at 19:18

## 2021-08-24 RX ADMIN — KETOROLAC TROMETHAMINE 10 MG: 10 TABLET, FILM COATED ORAL at 03:58

## 2021-08-24 RX ADMIN — FAMOTIDINE 20 MG: 20 TABLET, FILM COATED ORAL at 19:18

## 2021-08-24 RX ADMIN — RHO(D) IMMUNE GLOBULIN (HUMAN) 0.3 MG: 1500 SOLUTION INTRAMUSCULAR at 15:46

## 2021-08-24 RX ADMIN — DIPHENHYDRAMINE HYDROCHLORIDE 25 MG: 25 CAPSULE ORAL at 02:15

## 2021-08-24 RX ADMIN — ACETAMINOPHEN 1000 MG: 500 TABLET, FILM COATED ORAL at 02:15

## 2021-08-24 RX ADMIN — SIMETHICONE 80 MG: 80 TABLET, CHEWABLE ORAL at 12:34

## 2021-08-24 RX ADMIN — ACETAMINOPHEN 650 MG: 325 TABLET, FILM COATED ORAL at 15:46

## 2021-08-24 RX ADMIN — OXYCODONE 10 MG: 5 TABLET ORAL at 22:05

## 2021-08-24 RX ADMIN — DIPHENHYDRAMINE HYDROCHLORIDE 25 MG: 25 CAPSULE ORAL at 10:15

## 2021-08-24 RX ADMIN — IBUPROFEN 800 MG: 800 TABLET, FILM COATED ORAL at 19:18

## 2021-08-24 NOTE — PROGRESS NOTES
Post-Operative Day Number 1 Progress Note    Patient doing well post-op day 1 from  delivery without significant complaints. Pain controlled on current medication. Voiding without difficulty, normal lochia. Vitals:  Patient Vitals for the past 8 hrs:   BP Temp Pulse Resp SpO2   21 0729 (!) 114/59 97.7 °F (36.5 °C) 78 18 96 %   21 0433 (!) 95/44 98 °F (36.7 °C) 77 18 96 %     Temp (24hrs), Av.8 °F (36.6 °C), Min:97.5 °F (36.4 °C), Max:98 °F (36.7 °C)      Vital signs stable, afebrile. Exam:  Patient without distress. Abdomen soft, fundus firm at level of umbilicus, non tender. Incision dry and clean without erythema. Lower extremities are negative for swelling, cords or tenderness. Lab/Data Review:  CBC:   Lab Results   Component Value Date/Time    HGB 12.0 2021 06:14 AM    HCT 35.9 2021 06:14 AM       Assessment and Plan:  Patient appears to be having uncomplicated post- course. Continue routine post-op care and maternal education.

## 2021-08-24 NOTE — PROGRESS NOTES
Spoke with Dr. Marcio Canchola regarding pt iv discontinued per pt pulled out. Tolerating po well, output great. Per MD advance to po meds.

## 2021-08-24 NOTE — PROGRESS NOTES
Anesthesiology  Post-op Note    Post-op day 1 s/p  via spinal with neuraxial opioids for post-op pain management. Visit Vitals  BP (!) 95/44 (BP 1 Location: Right arm, BP Patient Position: At rest)   Pulse 77   Temp 36.7 °C (98 °F)   Resp 18   Ht 5' 5\" (1.651 m)   Wt 94.3 kg (208 lb)   LMP 2020   SpO2 96%   Breastfeeding Yes   BMI 34.61 kg/m²     Airway patent, patient appropriately hydrated and appears euvolemic. Patient is Alert and oriented. Pain is well controlled. Pruritus is well controlled. Nausea is absent. No complaints about back or site of injection. Motor and sensory function has returned to baseline in lower extremities. Patient is satisfied with anesthetic and reports no complications. Continue current orders, then initiate surgeon's orders for pain management 24 hours after . Follow up per surgeon.

## 2021-08-24 NOTE — LACTATION NOTE
This note was copied from a baby's chart. In to follow up with mom and infant. Mom stated that infant's blood sugar dropped last evening and infant had to be formula supplemented. Mom started pumping and has expressed a minute amount. She asked if she needed to continue to pump and I advised her to continue to pump as long as infant is being formula supplemented. She is using the curve tip syringe to feed supplement. Also encouraged her to offer infant the breast prior to formula feeding. Lactation consultant will follow up tomorrow.

## 2021-08-24 NOTE — PROGRESS NOTES
0438: B/P 77/46. Pt  will be d/c home with home hospice today by 0900 as ordered. House Dr Henson notified and gave order to give 500 ml bolus of 0.9 NaCL. Patient awake, nonverbal. Will keep monitoring.   Chart reviewed - depression/anxiety/postpartum depression. SW met with patient while social distancing w/appropriate PPE. Patient confirms a history of postpartum depression & anxiety. She sought treatment after the birth of her second daughter in 2020. She was subsequently placed on Zoloft and has found this medication beneficial.  Additionally, patient is currently working with a therapist.  Patient plans to remain on Zoloft and continue therapy postpartum. Patient given informational packet on  mood & anxiety disorders (resources/education). Family denies any additional needs from  at this time. Family has 's contact information should any needs/questions arise.     RADHAMES Engle-TIFFANIE  [facility]   [phone]

## 2021-08-25 VITALS
HEART RATE: 75 BPM | HEIGHT: 65 IN | DIASTOLIC BLOOD PRESSURE: 61 MMHG | SYSTOLIC BLOOD PRESSURE: 113 MMHG | RESPIRATION RATE: 16 BRPM | WEIGHT: 208 LBS | BODY MASS INDEX: 34.66 KG/M2 | TEMPERATURE: 98.3 F | OXYGEN SATURATION: 95 %

## 2021-08-25 PROCEDURE — 74011250637 HC RX REV CODE- 250/637: Performed by: OBSTETRICS & GYNECOLOGY

## 2021-08-25 RX ORDER — OXYCODONE HYDROCHLORIDE 5 MG/1
5 TABLET ORAL
Qty: 20 TABLET | Refills: 0 | Status: SHIPPED | OUTPATIENT
Start: 2021-08-25 | End: 2021-08-30

## 2021-08-25 RX ORDER — IBUPROFEN 800 MG/1
800 TABLET ORAL
Qty: 40 TABLET | Refills: 1 | Status: SHIPPED | OUTPATIENT
Start: 2021-08-25 | End: 2021-09-13

## 2021-08-25 RX ADMIN — ACETAMINOPHEN 650 MG: 325 TABLET, FILM COATED ORAL at 09:43

## 2021-08-25 RX ADMIN — SERTRALINE HYDROCHLORIDE 100 MG: 100 TABLET ORAL at 09:43

## 2021-08-25 RX ADMIN — ACETAMINOPHEN 650 MG: 325 TABLET, FILM COATED ORAL at 06:06

## 2021-08-25 RX ADMIN — SIMETHICONE 80 MG: 80 TABLET, CHEWABLE ORAL at 08:10

## 2021-08-25 RX ADMIN — IBUPROFEN 800 MG: 800 TABLET, FILM COATED ORAL at 02:20

## 2021-08-25 RX ADMIN — OXYCODONE 5 MG: 5 TABLET ORAL at 11:25

## 2021-08-25 RX ADMIN — DOCUSATE SODIUM 100 MG: 100 CAPSULE, LIQUID FILLED ORAL at 08:11

## 2021-08-25 RX ADMIN — OXYCODONE 10 MG: 5 TABLET ORAL at 02:21

## 2021-08-25 RX ADMIN — SIMETHICONE 80 MG: 80 TABLET, CHEWABLE ORAL at 11:26

## 2021-08-25 RX ADMIN — IBUPROFEN 800 MG: 800 TABLET, FILM COATED ORAL at 08:11

## 2021-08-25 NOTE — LACTATION NOTE
This note was copied from a baby's chart. In to see mom and infant for discharge. Mom states baby starting to latch and feed a little better than yesterday, but still not great. Mom only pumped once yesterday, but that time did get 15 mls of breastmilk. She has been supplementing baby w/ formula. Gave parents printed feeding plan and reviewed in detail w/ parents how to use for guidance if baby not feeding well, and/or milk supply not in very much yet. Mom has used feeding plan w/ prior babies at this hospital. They verbalized understanding how to use. Mom unsure if they have a pump at home, offered rental. Mom to call out if decides to rent pump. Reviewed discharge info and how to manage period of engorgement. Encouraged mom to call out if wants help w/ breast feeding today, otherwise, no further needs at this time.

## 2021-08-25 NOTE — DISCHARGE SUMMARY
[facility] [address] [place] [address] Discharge Summary     Patient ID:  Dania Barger  [id number]  22 y.o.  1990    Admit date: 2021    Discharge date and time: No discharge date for patient encounter. Admitting Physician: Beverly Castro MD     Discharge Physician: Lana Agrawal M.D. Admission Diagnoses: History of  [Z98.891];39 weeks gestation of pregnancy [Z3A.39]    Problem List: Hospital - Principal Problem:    39 weeks gestation of pregnancy (2021)     ; Other -   Patient Active Problem List   Diagnosis Code    Rh negative status during pregnancy O26.899, Z67.91    Short interval between pregnancies affecting pregnancy, antepartum O09.899    Pregnancy Z34.90    Genital herpes complicating pregnancy F99.538, A60.09    History of  Z98.891    Anxiety and depression F41.9, F32.9    GBS (group B streptococcus) UTI complicating pregnancy C70.00, B95.1    39 weeks gestation of pregnancy Z3A.39        Discharge Diagnoses: History of  [Z98.891];39 weeks gestation of pregnancy [Z3A.39]    Hospital Course: Dania Barger had unremarkeable progressive recovery. and Eating, ambulating, and voiding in a routine manner. Significant Diagnostic Studies: No results found for this or any previous visit (from the past 24 hour(s)). Procedures: repeat  section, low transverse incision    Discharge Exam:  Visit Vitals  /61 (BP 1 Location: Right arm, BP Patient Position: Sitting)   Pulse 75   Temp 98.3 °F (36.8 °C)   Resp 16   Ht 5' 5\" (1.651 m)   Wt 208 lb (94.3 kg)   LMP 2020   SpO2 95%   Breastfeeding Yes   BMI 34.61 kg/m²        Heart: regular rate and rhythm, S1, S2 normal, no murmur, click, rub or gallop  Lungs:clear to auscultation bilaterally  Extremities: normal, atraumatic, no cyanosis or edema.  No DVT  Incision/episiotomy: no significant drainage, no dehiscence, no significant erythema    Patient Instructions:   Current Discharge Medication List      START taking these medications    Details   ibuprofen (MOTRIN) 800 mg tablet Take 1 Tablet by mouth every eight (8) hours as needed for Pain. Indications: pain  Qty: 40 Tablet, Refills: 1      oxyCODONE IR (ROXICODONE) 5 mg immediate release tablet Take 1 Tablet by mouth every six (6) hours as needed for Pain for up to 5 days. Max Daily Amount: 20 mg. Indications: pain  Qty: 20 Tablet, Refills: 0    Associated Diagnoses:  delivery delivered         CONTINUE these medications which have NOT CHANGED    Details   omeprazole magnesium (PRILOSEC PO) Take  by mouth. sertraline (Zoloft) 50 mg tablet Take 100 mg by mouth daily. [id number] nadeem-folic-dha (PRENATAL DHA+COMPLETE PRENATAL) -300 mg-mcg-mg cmpk Take  by mouth.      pyridoxine, vitamin B6, (VITAMIN B-6) 25 mg tablet Take 25 mg by mouth three (3) times daily. doxylamine succinate (UNISOM) 25 mg tablet Take 25 mg by mouth nightly as needed. Activity: physical activity is restricted per discharge instructions  Diet: resume normal diet  Wound Care: Keep wound clean and dry, as directed    Follow-up with [facility] in 2 weeks.     Signed:  Dru Calderón MD  2021  9:20 AM

## 2021-08-25 NOTE — LACTATION NOTE

## 2021-08-25 NOTE — LACTATION NOTE
This note was copied from a baby's chart. Individualized Feeding Plan for Breastfeeding   Lactation Services [phone]      As much as possible, hold your baby on your chest so babys bare skin is against your bare skin with a blanket covering babys back, especially 30 minutes before it is time for baby to eat. Watch for early feeding cues such as, licking lips, sucking motions, rooting, hands to mouth. Crying is a late feeding cue. Feed your baby at least 8 times in 24 hours, or more if your baby is showing feeding cues. If baby is sleepy put baby skin to skin and watch for hunger cues. To rouse baby: unwrap, undress, massage hands, feet, & back, change diaper, gently change babys position from lying to sitting. 15-20 minutes on the first breast of active breastfeeding is considered a good feeding. Good, active breastfeeding is when baby is alert, tugging the nipple, their ear may move, and you can hear swallows. Allow baby to finish the first side before changing sides. Sleeping at the breast or only brief, light sucks should not be considered a good, full breastfeed. At each feeding:  __x__1. Do Suck Practice on finger before each feeding until sucking pattern is smooth. Try using index finger. Nail down towards tongue. __x__2. Hand Express for a few minutes prior to latching to help start milk flow. __x__3. Baby needs to NURSE WELL x 15-20 minutes on at least first breast, burp and offer 2nd breast at every feeding. If no sustained latch only attempt at breast for 10 minutes. If baby does not latch on and feed well on at least one side, you should:   __x__4. Double pump for 15 minutes with breast massage and compression. Hand express for an additional 2-3 minutes per side. Pump after each feeding attempt or poor feeding, up to 8 times per day. If you are not putting baby to the breast you need to pump 8 times a day. Pump every 3 hours. __x__5.  Give baby all of the breast milk you obtain using a straight syringe or  curved syringe. If baby does NOT have enough wet and dirty diapers per day, is jaundiced/lethargic, or has significant weight loss AND you do NOT pump enough milk for each feeding (per volume listed below), formula supplementation may need to be used. Call lactation department /pediatrician if you have concerns. AVERAGE INTAKES OF COLOSTRUM BY HEALTHY  INFANTS:  Time  Day Intake (ml per feeding)  Based on 8 feedings per day. 1st 24 hrs  1 2-10 ml  24-48 hrs  2 5-15 ml  48-72 hrs  3 15-30 ml (0.5-1 oz)  72-96 hrs  4 30-45 ml (1-1.5oz)                          5-6      45-60 ml (1.5-2oz)                           7         90 ml (3oz)    By day 7, baby will need 90 ml or 3 oz at each feeding based on 8 feedings per day & babys weight. (1oz = 30ml). Total milk volume needed in 24 hours by Day 7 is 23 oz per day based on baby's birthweight of 8lb 9oz. The more often baby eats, the less volume they need per feeding. If baby is eating more often than the minimum of 8 times per day, they may take less per feeding. Comments: If pumping, suggest using olive oil or coconut oil on your nipples before pumping to help reduce the friction. Use feeding plan until follow up with pediatrician. Continue to attempt at the breast for most feeds. Pump every 3 hours if no latch. Give all pumped colostrum/breastmilk at each feeding. OUTPATIENT APPOINTMENT Suggested. Outpatient services are located on the 4th floor at [facility]. Check in at the 4th floor registration desk (the same one you used when you came to have your baby).   Call for questions [phone]

## 2021-08-25 NOTE — DISCHARGE INSTRUCTIONS
Patient Education         Section: What to Expect at Home  Your Recovery     A  section, or , is surgery to deliver your baby through a cut that the doctor makes in your lower belly and uterus. The cut is called an incision. You may have some pain in your lower belly and need pain medicine for 1 to 2 weeks. You can expect some vaginal bleeding for several weeks. You will probably need about 6 weeks to fully recover. It's important to take it easy while the incision heals. Avoid heavy lifting, strenuous activities, and exercises that strain the belly muscles while you recover. Ask a family member or friend for help with housework, cooking, and shopping. This care sheet gives you a general idea about how long it will take for you to recover. But each person recovers at a different pace. Follow the steps below to get better as quickly as possible. How can you care for yourself at home? Activity    · Rest when you feel tired. Getting enough sleep will help you recover.     · Try to walk each day. Start by walking a little more than you did the day before. Bit by bit, increase the amount you walk. Walking boosts blood flow and helps prevent pneumonia, constipation, and blood clots.     · Avoid strenuous activities, such as bicycle riding, jogging, weightlifting, and aerobic exercise, for 6 weeks or until your doctor says it is okay.     · Until your doctor says it is okay, do not lift anything heavier than your baby.     · Do not do sit-ups or other exercises that strain the belly muscles for 6 weeks or until your doctor says it is okay.     · Hold a pillow over your incision when you cough or take deep breaths. This will support your belly and decrease your pain.     · You may shower as usual. Pat the incision dry when you are done.     · You will have some vaginal bleeding. Wear sanitary pads.  Do not douche or use tampons until your doctor says it is okay.     · Ask your doctor when you can drive again.     · You will probably need to take at least 6 weeks off work. It depends on the type of work you do and how you feel.     · Ask your doctor when it is okay for you to have sex. Diet    · You can eat your normal diet. If your stomach is upset, try bland, low-fat foods like plain rice, broiled chicken, toast, and yogurt.     · Drink plenty of fluids (unless your doctor tells you not to).     · You may notice that your bowel movements are not regular right after your surgery. This is common. Try to avoid constipation and straining with bowel movements. You may want to take a fiber supplement every day. If you have not had a bowel movement after a couple of days, ask your doctor about taking a mild laxative.     · If you are breastfeeding, limit alcohol. Alcohol can cause a lack of energy and other health problems for the baby when a breastfeeding woman drinks heavily. It can also get in the way of a mom's ability to feed her baby or to care for the child in other ways. There isn't a lot of research about exactly how much alcohol can harm a baby. Having no alcohol is the safest choice for your baby. If you choose to have a drink now and then, have only one drink, and limit the number of occasions that you have a drink. Wait to breastfeed at least 2 hours after you have a drink to reduce the amount of alcohol the baby may get in the milk. Medicines    · Your doctor will tell you if and when you can restart your medicines. He or she will also give you instructions about taking any new medicines.     · If you take aspirin or some other blood thinner, ask your doctor if and when to start taking it again. Make sure that you understand exactly what your doctor wants you to do.     · Take pain medicines exactly as directed. ? If the doctor gave you a prescription medicine for pain, take it as prescribed.   ? If you are not taking a prescription pain medicine, ask your doctor if you can take an over-the-counter medicine.     · If you think your pain medicine is making you sick to your stomach:  ? Take your medicine after meals (unless your doctor has told you not to). ? Ask your doctor for a different pain medicine.     · If your doctor prescribed antibiotics, take them as directed. Do not stop taking them just because you feel better. You need to take the full course of antibiotics. Incision care    · If you have strips of tape on the incision, leave the tape on for a week or until it falls off.     · Wash the area daily with warm, soapy water, and pat it dry. Don't use hydrogen peroxide or alcohol, which can slow healing. You may cover the area with a gauze bandage if it weeps or rubs against clothing. Change the bandage every day.     · Keep the area clean and dry. Other instructions    · If you breastfeed your baby, you may be more comfortable while you are healing if you place the baby so that he or she is not resting on your belly. Try tucking your baby under your arm, with his or her body along the side you will be feeding on. Support your baby's upper body with your arm. With that hand you can control your baby's head to bring his or her mouth to your breast. This is sometimes called the football hold. Follow-up care is a key part of your treatment and safety. Be sure to make and go to all appointments, and call your doctor if you are having problems. It's also a good idea to know your test results and keep a list of the medicines you take. When should you call for help? Call  911 anytime you think you may need emergency care. For example, call if:    · You have thoughts of harming yourself, your baby, or another person.     · You passed out (lost consciousness).     · You have chest pain, are short of breath, or cough up blood.     · You have a seizure.    Call your doctor now or seek immediate medical care if:    · You have pain that does not get better after you take pain medicine.     · You have severe vaginal bleeding.     · You are dizzy or lightheaded, or you feel like you may faint.     · You have new or worse pain in your belly or pelvis.     · You have loose stitches, or your incision comes open.     · You have symptoms of infection, such as:  ? Increased pain, swelling, warmth, or redness. ? Red streaks leading from the incision. ? Pus draining from the incision. ? A fever.     · You have symptoms of a blood clot in your leg (called a deep vein thrombosis), such as:  ? Pain in your calf, back of the knee, thigh, or groin. ? Redness and swelling in your leg or groin.     · You have signs of preeclampsia, such as:  ? Sudden swelling of your face, hands, or feet. ? New vision problems (such as dimness, blurring, or seeing spots). ? A severe headache. Watch closely for changes in your health, and be sure to contact your doctor if:    · You do not get better as expected. Where can you learn more? Go to http://www.shah.com/  Enter M806 in the search box to learn more about \" Section: What to Expect at Home. \"  Current as of: 2020               Content Version: 12.8   [employer]. Care instructions adapted under license by [employer] (which disclaims liability or warranty for this information). If you have questions about a medical condition or this instruction, always ask your healthcare professional. [place] [address] any warranty or liability for your use of this information.

## 2021-08-25 NOTE — PROGRESS NOTES
, Catrina, on unit. Notified that patient has EPDS of 17 and is on Zoloft and attends behavior counseling.

## 2021-09-13 PROBLEM — O23.40 GBS (GROUP B STREPTOCOCCUS) UTI COMPLICATING PREGNANCY: Status: RESOLVED | Noted: 2021-01-22 | Resolved: 2021-09-13

## 2021-09-13 PROBLEM — Z34.90 PREGNANCY: Status: RESOLVED | Noted: 2020-01-02 | Resolved: 2021-09-13

## 2021-09-13 PROBLEM — O09.899 SHORT INTERVAL BETWEEN PREGNANCIES AFFECTING PREGNANCY, ANTEPARTUM: Status: RESOLVED | Noted: 2020-01-02 | Resolved: 2021-09-13

## 2021-09-13 PROBLEM — Z67.91 RH NEGATIVE STATUS DURING PREGNANCY: Status: RESOLVED | Noted: 2018-11-07 | Resolved: 2021-09-13

## 2021-09-13 PROBLEM — O26.899 RH NEGATIVE STATUS DURING PREGNANCY: Status: RESOLVED | Noted: 2018-11-07 | Resolved: 2021-09-13

## 2021-09-13 PROBLEM — A60.09 GENITAL HERPES COMPLICATING PREGNANCY: Status: RESOLVED | Noted: 2021-01-20 | Resolved: 2021-09-13

## 2021-09-13 PROBLEM — Z3A.39 39 WEEKS GESTATION OF PREGNANCY: Status: RESOLVED | Noted: 2021-08-23 | Resolved: 2021-09-13

## 2021-09-13 PROBLEM — O98.319 GENITAL HERPES COMPLICATING PREGNANCY: Status: RESOLVED | Noted: 2021-01-20 | Resolved: 2021-09-13

## 2021-09-13 PROBLEM — B95.1 GBS (GROUP B STREPTOCOCCUS) UTI COMPLICATING PREGNANCY: Status: RESOLVED | Noted: 2021-01-22 | Resolved: 2021-09-13

## 2022-03-19 PROBLEM — F41.9 ANXIETY AND DEPRESSION: Status: ACTIVE | Noted: 2021-01-20

## 2022-03-19 PROBLEM — F32.A ANXIETY AND DEPRESSION: Status: ACTIVE | Noted: 2021-01-20

## 2022-03-20 PROBLEM — Z98.891 HISTORY OF C-SECTION: Status: ACTIVE | Noted: 2021-01-20

## 2022-09-12 ENCOUNTER — ROUTINE PRENATAL (OUTPATIENT)
Dept: OBGYN CLINIC | Age: 32
End: 2022-09-12
Payer: COMMERCIAL

## 2022-09-12 ENCOUNTER — TELEPHONE (OUTPATIENT)
Dept: OBGYN CLINIC | Age: 32
End: 2022-09-12

## 2022-09-12 VITALS — HEIGHT: 65 IN | WEIGHT: 175.2 LBS | BODY MASS INDEX: 29.19 KG/M2

## 2022-09-12 DIAGNOSIS — O20.0 THREATENED ABORTION AFFECTING INTRAUTERINE PREGNANCY: Primary | ICD-10-CM

## 2022-09-12 DIAGNOSIS — Z67.31 TYPE AB BLOOD, RH NEGATIVE: ICD-10-CM

## 2022-09-12 DIAGNOSIS — O03.9 MISCARRIAGE: ICD-10-CM

## 2022-09-12 DIAGNOSIS — Z32.01 PREGNANCY TEST POSITIVE: ICD-10-CM

## 2022-09-12 LAB
HCG, PREGNANCY, URINE, POC: POSITIVE
VALID INTERNAL CONTROL, POC: YES

## 2022-09-12 PROCEDURE — 96372 THER/PROPH/DIAG INJ SC/IM: CPT | Performed by: OBSTETRICS & GYNECOLOGY

## 2022-09-12 PROCEDURE — 99213 OFFICE O/P EST LOW 20 MIN: CPT | Performed by: OBSTETRICS & GYNECOLOGY

## 2022-09-12 PROCEDURE — 81025 URINE PREGNANCY TEST: CPT | Performed by: OBSTETRICS & GYNECOLOGY

## 2022-09-12 PROCEDURE — 76817 TRANSVAGINAL US OBSTETRIC: CPT | Performed by: OBSTETRICS & GYNECOLOGY

## 2022-09-12 RX ORDER — SERTRALINE HYDROCHLORIDE 100 MG/1
200 TABLET, FILM COATED ORAL DAILY
Qty: 30 TABLET | Refills: 3 | Status: SHIPPED | OUTPATIENT
Start: 2022-09-12

## 2022-09-12 RX ORDER — MISOPROSTOL 200 UG/1
800 TABLET ORAL EVERY 12 HOURS
Qty: 8 TABLET | Refills: 0 | Status: SHIPPED | OUTPATIENT
Start: 2022-09-12 | End: 2022-09-13

## 2022-09-12 NOTE — TELEPHONE ENCOUNTER
Patient called stating she has had vaginal bleeding over the weekend. She is 12w1d pregnant based off of LMP of 6/19. [id number], hx one miscarriage. She states has been off and on bright red, when wiping and in toilet. Also cramping. Will work patient in for [address] [place] [address]. Dr. Bay Valle aware.

## 2022-09-12 NOTE — PROGRESS NOTES
Edison Berkowitz  is a 36 y.o. female, [id number], Patient's last menstrual period was 2022 (approximate). ,  who is seen for TAB ultrasound. Reports of vaginal bleeding over the weekend. She states has been off and on bright red, when wiping and in toilet. Also cramping. Ultrasound findings: Irregular possible GS visualized containing low level of debris, CRL not visualized. Patient should be 12w1d per LMP, GS measuring 7w0d. YS not visualized. Right ovary visualized with follicles and within normal limits. Left ovary visualized with follicles and within normal limits. Uterus is retroverted and heterogenous. Cervix appears within normal limits. No adnexal masses or free fluid visualized. Blood Type: AB Negative. HISTORY:  Sexual History:  has sex with males  Contraception:  none  Current Outpatient Medications on File Prior to Visit   Medication Sig Dispense Refill    norethindrone (MICRONOR) 0.35 MG tablet Take 0.35 mg by mouth daily (Patient not taking: Reported on 2022)       No current facility-administered medications on file prior to visit. ROS:  Review of [address] [place] [address]  has a past medical history of Breech presentation, Genital herpes complicating pregnancy, and Post partum depression. .    Previous surgeries include  has a past surgical history that includes  section. .    Her current meds are   Current Outpatient Medications:     sertraline (ZOLOFT) 100 MG tablet, Take 2 tablets by mouth daily, Disp: 30 tablet, Rfl: 3    miSOPROStol (CYTOTEC) 200 MCG tablet, Take 4 tablets by mouth in the morning and 4 tablets in the evening. Do all this for 2 doses. , Disp: 8 tablet, Rfl: 0    norethindrone (MICRONOR) 0.35 MG tablet, Take 0.35 mg by mouth daily (Patient not taking: Reported on 2022), Disp: , Rfl:      Family history is significant for family history includes Cancer in her paternal grandfather; [place] in her maternal grandfather; No Known Problems in her father, maternal grandmother, mother, and paternal grandmother. .       PHYSICAL EXAM:  Height 5' 5\" (1.651 m), weight 175 lb 3.2 oz (79.5 kg), last menstrual period 2022, not currently breastfeeding. OBGyn Exam   The patient appears well, alert, oriented x 3, in no distress    ASSESSMENT:  Encounter Diagnoses   Name Primary? Pregnancy test positive     Threatened  affecting intrauterine pregnancy Yes    Miscarriage     Type AB blood, Rh negative        PLAN:  Discussed with patient that this is a incomplete spontaneous AB. Discussed options observation medical management surgical management. Risk benefits alternatives all options were discussed with her in detail. At this point she wishes to proceed on with medical management we will treat her with 800 mcg of Cytotec and repeat in 12 hours. Risk were discussed with her in detail. We will see her back in approxiseventy 2 hours for repeat ultrasound to be sure everything passes. She will let us know if she has any problems sooner. We will also go ahead and get her shot RhoGAM today. All of her questions were answered. Orders Placed This Encounter   Procedures    AMB POC US OB TRANSVAGINAL     Order Specific Question:   Reason for Exam:     Answer:   EOB     Order Specific Question:   Are you Pregnant? Answer:    Yes    AMB POC URINE PREGNANCY TEST, VISUAL COLOR COMPARISON

## 2022-11-07 ENCOUNTER — TELEPHONE (OUTPATIENT)
Dept: OBGYN CLINIC | Age: 32
End: 2022-11-07

## 2022-11-07 NOTE — TELEPHONE ENCOUNTER
Pt LVM requesting refill of Sertraline 200mg. Qty: 30, 3 refills sent 9/22/22. Attempted to call pt but was unable to reach her - [employer] message sent.

## 2022-11-09 RX ORDER — SERTRALINE HYDROCHLORIDE 100 MG/1
200 TABLET, FILM COATED ORAL DAILY
Qty: 180 TABLET | Refills: 1 | Status: SHIPPED | OUTPATIENT
Start: 2022-11-09

## 2022-12-20 ENCOUNTER — TELEPHONE (OUTPATIENT)
Dept: OBGYN CLINIC | Age: 32
End: 2022-12-20

## 2022-12-21 ENCOUNTER — HOSPITAL ENCOUNTER (EMERGENCY)
Age: 32
Discharge: HOME | End: 2022-12-21
Attending: EMERGENCY MEDICINE
Payer: MEDICAID

## 2022-12-21 VITALS
RESPIRATION RATE: 16 BRPM | HEIGHT: 65 IN | WEIGHT: 185 LBS | HEART RATE: 64 BPM | TEMPERATURE: 97.7 F | SYSTOLIC BLOOD PRESSURE: 106 MMHG | BODY MASS INDEX: 30.82 KG/M2 | DIASTOLIC BLOOD PRESSURE: 53 MMHG | OXYGEN SATURATION: 97 %

## 2022-12-21 DIAGNOSIS — N93.9 VAGINAL BLEEDING: Primary | ICD-10-CM

## 2022-12-21 LAB
ANION GAP SERPL CALC-SCNC: 7 MMOL/L (ref 2–11)
BUN SERPL-MCNC: 22 MG/DL (ref 6–23)
CALCIUM SERPL-MCNC: 9 MG/DL (ref 8.3–10.4)
CHLORIDE SERPL-SCNC: 109 MMOL/L (ref 101–110)
CO2 SERPL-SCNC: 23 MMOL/L (ref 21–32)
CREAT SERPL-MCNC: 0.64 MG/DL (ref 0.6–1)
ERYTHROCYTE [DISTWIDTH] IN BLOOD BY AUTOMATED COUNT: 11.9 % (ref 11.9–14.6)
GLUCOSE SERPL-MCNC: 108 MG/DL (ref 65–100)
HCT VFR BLD AUTO: 39.2 % (ref 35.8–46.3)
HGB BLD-MCNC: 13.2 G/DL (ref 11.7–15.4)
MCH RBC QN AUTO: 30 PG (ref 26.1–32.9)
MCHC RBC AUTO-ENTMCNC: 33.7 G/DL (ref 31.4–35)
MCV RBC AUTO: 89.1 FL (ref 82–102)
NRBC # BLD: 0 K/UL (ref 0–0.2)
PLATELET # BLD AUTO: 265 K/UL (ref 150–450)
PMV BLD AUTO: 9.4 FL (ref 9.4–12.3)
POTASSIUM SERPL-SCNC: 3.7 MMOL/L (ref 3.5–5.1)
RBC # BLD AUTO: 4.4 M/UL (ref 4.05–5.2)
SODIUM SERPL-SCNC: 139 MMOL/L (ref 133–143)
WBC # BLD AUTO: 9.6 K/UL (ref 4.3–11.1)

## 2022-12-21 PROCEDURE — 85027 COMPLETE CBC AUTOMATED: CPT

## 2022-12-21 PROCEDURE — 80048 BASIC METABOLIC PNL TOTAL CA: CPT

## 2022-12-21 PROCEDURE — 99283 EMERGENCY DEPT VISIT LOW MDM: CPT

## 2022-12-21 ASSESSMENT — PAIN DESCRIPTION - FREQUENCY: FREQUENCY: INTERMITTENT

## 2022-12-21 ASSESSMENT — LIFESTYLE VARIABLES
HOW MANY STANDARD DRINKS CONTAINING ALCOHOL DO YOU HAVE ON A TYPICAL DAY: PATIENT DOES NOT DRINK
HOW OFTEN DO YOU HAVE A DRINK CONTAINING ALCOHOL: NEVER

## 2022-12-21 ASSESSMENT — PAIN DESCRIPTION - LOCATION: LOCATION: RECTUM

## 2022-12-21 ASSESSMENT — PAIN SCALES - GENERAL: PAINLEVEL_OUTOF10: 7

## 2022-12-22 ENCOUNTER — TELEPHONE (OUTPATIENT)
Dept: OBGYN CLINIC | Age: 32
End: 2022-12-22

## 2022-12-22 NOTE — TELEPHONE ENCOUNTER
Pt called about \"mood stabilizer\". Made multiple attempts to contact the pt but someone would answer the phone and then hang up?  But pt needs to follow up with psychiatrist.

## 2022-12-22 NOTE — ED NOTES
I have reviewed discharge instructions with the patient and spouse. The patient and spouse verbalized understanding. Patient left ED via Discharge Method: ambulatory to Home with spouse. Opportunity for questions and clarification provided. Patient given 0 scripts. To continue your aftercare when you leave the hospital, you may receive an automated call from our care team to check in on how you are doing. This is a free service and part of our promise to provide the best care and service to meet your aftercare needs.  If you have questions, or wish to unsubscribe from this service please call [phone]. Thank you for Choosing our [address] [place] [address] Emergency Department.       Juliana Berrios RN  12/21/22 [id number]

## 2022-12-22 NOTE — TELEPHONE ENCOUNTER
Called the pt back and discussed w/ her that she would need to see a psychiatrist.     Pt also states she was seen at the ER yesterday for excessive vaginal bleeding and wants to schedule a follow-up. We saw her back in September for miscarriage and prescribed her Cytotec however pt never returned for an ultrasound to check for RPOC. Patient transferred to schedule an ultrasound and visit w/ provider.

## 2022-12-22 NOTE — ED PROVIDER NOTES
Emergency Department Provider Note                   PCP:                Corrinne Naval, MD               Age: 29 y.o. Sex: female       ICD-10-CM    1. Vaginal bleeding  N93.9           DISPOSITION Decision To Discharge 12/21/2022 10:14:44 PM       MDM  Number of Diagnoses or Management Options  Vaginal bleeding  Diagnosis management comments: We discussed starting birth control pills for bleeding. At this point they prefer just to follow-up with gynecologist which I am referring him to. Vital signs are stable and she has a hemoglobin of 13.2 at this time she is safe for discharge she was given return precautions. Orders Placed This Encounter   Procedures    Basic Metabolic Panel    CBC    POCT Urine Dipstick    PELVIC EXAM SET UP    POC Pregnancy Urine Qual    Saline lock IV        Medications given in the Emergency Department:  Medications - No data to display    New Prescriptions    No medications on file        Sulma Mckay is a 29 y.o. female who presents to the Emergency Department with chief complaint of    Chief Complaint   Patient presents with    Vaginal Bleeding      Patient has a history of 2 miscarriages in the past last had a miscarriage in September of this year. States she has been having some irregular periods since. This evening around 730 about an hour and a half ago she developed a gush of blood. It did have blood clots mixed with that which was unusual for her. She does not believe she is pregnant currently. She has had some cramping with it as well. Currently denies any abdominal pain    The history is provided by the patient. All other systems reviewed and are negative unless otherwise stated in the History of Present Illness section.        Past Medical History:   Diagnosis Date    Breech presentation 7/15/2020    Genital herpes complicating pregnancy 8/81/5519    Post partum depression 11/20/2020        Past Surgical History: Procedure Laterality Date     SECTION          Family History   Problem Relation Age of Onset    Lung Cancer Maternal Grandfather     No Known Problems Paternal Grandmother     No Known Problems Father     No Known Problems Mother     Cancer Paternal Grandfather         Type unknown    No Known Problems Maternal Grandmother         Social History     Socioeconomic History    Marital status:      Spouse name: None    Number of children: None    Years of education: None    Highest education level: None   Tobacco Use    Smoking status: Never    Smokeless tobacco: Never   Substance and Sexual Activity    Alcohol use: No    Drug use: No    Sexual activity: Yes     Partners: Male     Birth control/protection: None   Social History Narrative    Denies sexual or physical abuse        Allergies: Sulfa antibiotics    Previous Medications    MISOPROSTOL (CYTOTEC) 200 MCG TABLET    Take 4 tablets by mouth in the morning and 4 tablets in the evening. Do all this for 2 doses. NORETHINDRONE (MICRONOR) 0.35 MG TABLET    Take 0.35 mg by mouth daily    SERTRALINE (ZOLOFT) 100 MG TABLET    Take 2 tablets by mouth daily    SERTRALINE (ZOLOFT) 100 MG TABLET    Take 2 tablets by mouth daily        Vitals signs and nursing note reviewed. ED Triage Vitals [22]   Enc Vitals Group      BP (!) 113/58      Heart Rate 55      Resp 18      Temp 97.7 °F (36.5 °C)      Temp Source Oral      SpO2 100 %      Weight 185 lb (83.9 kg)      Height 5' 5\" (1.651 m)      Head Circumference       Peak Flow       Pain Score       Pain Loc       Pain Edu? Excl. in [address]? Physical Exam  Vitals and nursing note reviewed. Constitutional:       General: She is not in acute distress. Appearance: She is not ill-appearing, toxic-appearing or diaphoretic. HENT:      Head: Normocephalic and atraumatic. Eyes:      General: No scleral icterus.      Conjunctiva/sclera: Conjunctivae normal.   Pulmonary:      Effort: Pulmonary effort is normal. No respiratory distress. Breath sounds: No stridor. Abdominal:      General: Abdomen is flat. There is no distension. Palpations: There is no mass. Tenderness: There is no abdominal tenderness. There is no right CVA tenderness, left CVA tenderness, guarding or rebound. Hernia: No hernia is present. Genitourinary:     Comments:  dried blood external to the vagina. Minimal blood in the vaginal canal no active bleeding. Musculoskeletal:      Cervical back: Normal range of motion and neck supple. Neurological:      Mental Status: She is alert. Mental status is at baseline. Psychiatric:         Mood and Affect: Mood normal.         Behavior: Behavior normal.        Procedures      Results Include:    Recent Results (from the past 24 hour(s))   Basic Metabolic Panel    Collection Time: 12/21/22  9:24 PM   Result Value Ref Range    Sodium 139 133 - 143 mmol/L    Potassium 3.7 3.5 - 5.1 mmol/L    Chloride 109 101 - 110 mmol/L    CO2 23 21 - 32 mmol/L    Anion Gap 7 2 - 11 mmol/L    Glucose 108 (H) 65 - 100 mg/dL    BUN 22 6 - 23 MG/DL    Creatinine 0.64 0.6 - 1.0 MG/DL    Est, Glom Filt Rate >60 >60 ml/min/1.73m2    Calcium 9.0 8.3 - 10.4 MG/DL   CBC    Collection Time: 12/21/22  9:24 PM   Result Value Ref Range    WBC 9.6 4.3 - 11.1 K/uL    RBC 4.40 4.05 - 5.2 M/uL    Hemoglobin 13.2 11.7 - 15.4 g/dL    Hematocrit 39.2 35.8 - 46.3 %    MCV 89.1 82.0 - 102.0 FL    MCH 30.0 26.1 - 32.9 PG    MCHC 33.7 31.4 - 35.0 g/dL    RDW 11.9 11.9 - 14.6 %    Platelets 496 919 - 087 K/uL    MPV 9.4 9.4 - 12.3 FL    nRBC 0.00 0.0 - 0.2 K/uL          No orders to display                           Voice dictation software was used during the making of this note. This software is not perfect and grammatical and other typographical errors may be present. This note has not been completely proofread for errors.        Charlene Azar MD  12/21/22 0236

## 2022-12-22 NOTE — ED TRIAGE NOTES
PT arrives via EMS from home. PT has hx of 2 miscarriages. Pt started bleeding around 1930 with some clots. Pt states she doesn't have regular periods.

## 2022-12-30 NOTE — PROGRESS NOTES
Carrington Mason  is a 29 y.o. female, [id number], Patient's last menstrual period was 06/19/2022 (approximate). ,  who is seen for gyn ultrasound performed secondary to irregular vaginal bleeding since miscarriage in September. Patient seen back in September for MAB; prescribed 800 mg of Cytotec (Patient never took Cytotec); plan to repeat ultrasound to be sure everything passed; patient did not follow-up. Presents to the emergency room on 12/21/2022 with chief complaints of excessive vaginal bleeding. She was offered birth control pill however declined and preferred to follow-up with gynecologist.     Patient originally contacted the office for mood stabilizer prescription but when on the phone with patient she mentioned she had been seen in the ER recently and wanted to know if she needed a follow up appointment. Ultrasound findings from today:  Cervix appears within normal limits. Uterus is retroverted and very heterogenous. Slight increase vascularity noted. Endo appears with have RPOC, with very irregular, heterogenous GS measuring 6W6D (7W0D previously) Questionable YS seen within debris. Endo distended to 32.4 mm by RPOC and possible blood products. ROV visualized with multiple follicles, possible PCOS in appearance, and 2 cystic masses. (1) Possible CL= 2.1 x 1.4 x 1.5 cm.   (2) Simple with possible small mural nodule= 1.9 x 1.6 x 1.7 cm. Nodule= 0.3 x 0.2 x 0.2 cm. LOV visualized with multiple follicles, possible PCOS in appearance. No adnexa masses or fluid seen.       HISTORY:  Sexual History:  has sex with males  Contraception:  none  Current Outpatient Medications on File Prior to Visit   Medication Sig Dispense Refill    sertraline (ZOLOFT) 100 MG tablet Take 2 tablets by mouth daily 30 tablet 3    sertraline (ZOLOFT) 100 MG tablet Take 2 tablets by mouth daily (Patient not taking: Reported on 1/3/2023) 180 tablet 1    norethindrone (MICRONOR) 0.35 MG tablet Take 0.35 mg by mouth daily (Patient not taking: No sig reported)       No current facility-administered medications on file prior to visit. ROS:  Review of [facility]  has a past medical history of Breech presentation, Genital herpes complicating pregnancy, and Post partum depression. .    Previous surgeries include  has a past surgical history that includes  section. .    Her current meds are   Current Outpatient Medications:     sertraline (ZOLOFT) 100 MG tablet, Take 2 tablets by mouth daily, Disp: 30 tablet, Rfl: 3    sertraline (ZOLOFT) 100 MG tablet, Take 2 tablets by mouth daily (Patient not taking: Reported on 1/3/2023), Disp: 180 tablet, Rfl: 1    norethindrone (MICRONOR) 0.35 MG tablet, Take 0.35 mg by mouth daily (Patient not taking: No sig reported), Disp: , Rfl:      Family history is significant for family history includes Cancer in her paternal grandfather; Kyra Bermeo in her maternal grandfather; No Known Problems in her father, maternal grandmother, mother, and paternal grandmother. .       PHYSICAL EXAM:  Height 5' 5\" (1.651 m), weight 183 lb 12.8 oz (83.4 kg), last menstrual period 2022, not currently breastfeeding. OBGyn Exam   The patient appears well, alert, oriented x 3, in no distress. ASSESSMENT:  Encounter Diagnoses   Name Primary? Vaginal bleeding Yes    Pregnancy examination or test, negative result     Missed ab        PLAN:  Discussed this appears to be a missed spontaneous AB. I do not think this is a new pregnancy and that her hCG urine is negative today we will look at a quantitative just to be sure. Discussed this point I think she would require D&C with hysteroscopic resection of the endometrial tissue due to the fact that this has been a longstanding material within the uterus. All of her questions were answered. We will send tissue for pathology. We will go ahead and look at a serum hCG just to be sure and work on getting this scheduled.   She is being referred to a psychiatrist from her therapist.  Orders Placed This Encounter   Procedures    AMB POC US, TRANSVAGINAL     Order Specific Question:   Reason for Exam:     Answer:   GYN     Order Specific Question:   Are you Pregnant?      Answer:   No    HCG, Quantitative, Pregnancy     Standing Status:   Future     Number of Occurrences:   1     Standing Expiration Date:   1/3/2024    AMB POC URINE PREGNANCY TEST, VISUAL COLOR COMPARISON

## 2023-01-03 ENCOUNTER — OFFICE VISIT (OUTPATIENT)
Dept: OBGYN CLINIC | Age: 33
End: 2023-01-03
Payer: MEDICAID

## 2023-01-03 VITALS — HEIGHT: 65 IN | BODY MASS INDEX: 30.62 KG/M2 | WEIGHT: 183.8 LBS

## 2023-01-03 DIAGNOSIS — O02.1 MISSED AB: ICD-10-CM

## 2023-01-03 DIAGNOSIS — N93.9 VAGINAL BLEEDING: Primary | ICD-10-CM

## 2023-01-03 DIAGNOSIS — Z32.02 PREGNANCY EXAMINATION OR TEST, NEGATIVE RESULT: ICD-10-CM

## 2023-01-03 LAB
HCG, PREGNANCY, URINE, POC: NEGATIVE
VALID INTERNAL CONTROL, POC: YES

## 2023-01-03 PROCEDURE — 76830 TRANSVAGINAL US NON-OB: CPT | Performed by: OBSTETRICS & GYNECOLOGY

## 2023-01-03 PROCEDURE — 99214 OFFICE O/P EST MOD 30 MIN: CPT | Performed by: OBSTETRICS & GYNECOLOGY

## 2023-01-03 PROCEDURE — 81025 URINE PREGNANCY TEST: CPT | Performed by: OBSTETRICS & GYNECOLOGY

## 2023-01-04 LAB — HCG SERPL-ACNC: <1 MIU/ML (ref 0–6)

## 2023-01-04 NOTE — RESULT ENCOUNTER NOTE
Labs are normal continue with plan Received call from Donnie Ashlie  reagrding her SCS, shes is wanting it taken out. Dr. Bronwyn George is on vacation and waiting on Dr. Davian Oreilly to come back into office to see how we should proceed. Will call pt back once Dr. Davian Oreilly let us know what his thoughts are.

## 2023-01-25 ENCOUNTER — PREP FOR PROCEDURE (OUTPATIENT)
Dept: OBGYN CLINIC | Age: 33
End: 2023-01-25

## 2023-01-25 PROBLEM — O02.1 MISSED ABORTION: Status: ACTIVE | Noted: 2023-01-25

## 2023-01-25 PROBLEM — N93.9 ABNORMAL UTERINE BLEEDING: Status: ACTIVE | Noted: 2023-01-25

## 2023-01-30 NOTE — PROGRESS NOTES
Preop Visit    Ms. Jannis Libman presents for a preop visit. She is scheduled for a Dilatation and Curettage Hysteroscopy with Myosure on 02/09/2023. Her history, meds, and allergies were reviewed. The procedure was reviewed in detail as well as the risks of bleeding, infection, DVT and potential surgical complications involving the bladder, ureters, colon or intestines. Also the alternatives,  benefits, recovery and follow-up. Prevention of SSI discussed as indicated. All of her questions were answered. Exam:  Lungs CTAB  Heart RRR    See the hospital H&P as well as prior chart for details.

## 2023-01-31 ENCOUNTER — OFFICE VISIT (OUTPATIENT)
Dept: OBGYN CLINIC | Age: 33
End: 2023-01-31
Payer: MEDICAID

## 2023-01-31 VITALS
DIASTOLIC BLOOD PRESSURE: 64 MMHG | BODY MASS INDEX: 30.75 KG/M2 | HEIGHT: 65 IN | SYSTOLIC BLOOD PRESSURE: 110 MMHG | WEIGHT: 184.6 LBS

## 2023-01-31 DIAGNOSIS — O03.4 RETAINED PRODUCTS OF CONCEPTION FOLLOWING ABORTION: Primary | ICD-10-CM

## 2023-01-31 PROCEDURE — 99213 OFFICE O/P EST LOW 20 MIN: CPT | Performed by: OBSTETRICS & GYNECOLOGY

## 2023-01-31 PROCEDURE — 76830 TRANSVAGINAL US NON-OB: CPT | Performed by: OBSTETRICS & GYNECOLOGY

## 2023-01-31 NOTE — PROGRESS NOTES
Preop Visit    Ms. Kirsten Cortes presents for a preop visit.  She is scheduled for a Dilatation and Curettage Hysteroscopy with Myosure on 02/09/2023. Her history, meds, and allergies were reviewed.  The procedure was reviewed in detail as well as the risks of bleeding, infection, DVT and potential surgical complications involving the bladder, ureters, colon or intestines.  Also the alternatives,  benefits, recovery and follow-up. Prevention of SSI discussed as indicated.  All of her questions were answered.  Had passed large clot then had normal period    Discussed with her that since she had this large clot/tissue pass that we should look at an ultrasound to evaluate for continued retained products.    Ultrasound was reviewed today which shows normal uterus with endometrial stripe of 12 mm previously seen gestational sac is no longer visualized please see report under imaging    Exam:  Lungs CTAB  Heart RRR    Discussed with the patient appears that everything is passed and therefore not think we need to proceed on with the surgery.  Did discuss we ultrasounding in approximately 6 weeks just to verify that her strength is within rationale for the plan was discussed with the patient.  She has been having some cramping with her periods therefore discussed the use of over-the-counter nonsteroidals using up to 800 mg of ibuprofen every 6 hours as needed.

## 2023-03-13 NOTE — PROGRESS NOTES
Parag  is a 29 y.o. female, [id number], Patient's last menstrual period was 02/19/2023 (exact date). ,  who is seen for gyn ultrasound performed secondary to follow up evaluation of RPOC. Ultrasound findings from 01/03/2023:  Cervix appears within normal limits. Uterus is retroverted and very heterogenous. Slight increase vascularity noted. Endo appears with have RPOC, with very irregular, heterogenous GS measuring 6W6D (7W0D previously) Questionable YS seen within debris. Endo distended to 32.4 mm by RPOC and possible blood products. ROV visualized with multiple follicles, possible PCOS in appearance, and 2 cystic masses. (1) Possible CL= 2.1 x 1.4 x 1.5 cm.   (2) Simple with possible small mural nodule= 1.9 x 1.6 x 1.7 cm. Nodule= 0.3 x 0.2 x 0.2 cm. LOV visualized with multiple follicles, possible PCOS in appearance. No adnexa masses or fluid seen. Ultrasound findings from 01/31/2023:   Cervix appears within normal limits. Uterus is anteverted, retroflexed and heterogenous. Endo= 12.8 mm, heterogenous and possible blood products within cavity, no definite intracavitary masses visualized. Previously mentioned GS not visualized today. ROV visualized with follicles and cyst with thin, vascular septation measuring 1.8 x 1.3 x 1.6 cm. (Previously 1.9 x 1.6 x 1.7 cm). LOV visualized with follicles and within normal limits. No adnexa masses or fluid seen. Ultrasound findings from today:   Cervix appears within normal limits. Uterus is retroflexed and heterogenous. Hypervascular parenchyma noted. Endo= 13.9 mm, no definite intracavitary masses visualized. Endo is still heterogenous with increased vascularity, Debris noted moving through with some cystic areas still noted. ROV visualized with follicles and hemorrhagic CL measuring 1.1 x 0.8 x 1.1 cm   LOV visualized with follicles and hemorrhagic CL measuring 1.9 x 1.3 x 1.7 cm. No adnexa masses or fluid seen.     HISTORY:  Sexual History:  has sex with males  Contraception:  none  Current Outpatient Medications on File Prior to Visit   Medication Sig Dispense Refill    lamoTRIgine (LAMICTAL) 25 MG tablet TAKE 1 TABLET BY MOUTH EVERY DAY      sertraline (ZOLOFT) 100 MG tablet Take 2 tablets by mouth daily 30 tablet 3     No current facility-administered medications on file prior to visit. ROS:  Review of [facility]  has a past medical history of Breech presentation, Genital herpes complicating pregnancy, and Post partum depression. .    Previous surgeries include  has a past surgical history that includes  section. .    Her current meds are   Current Outpatient Medications:     lamoTRIgine (LAMICTAL) 25 MG tablet, TAKE 1 TABLET BY MOUTH EVERY DAY, Disp: , Rfl:     sertraline (ZOLOFT) 100 MG tablet, Take 2 tablets by mouth daily, Disp: 30 tablet, Rfl: 3     Family history is significant for family history includes Cancer in her paternal grandfather; Samia Kong in her maternal grandfather; No Known Problems in her father, maternal grandmother, mother, and paternal grandmother. .       PHYSICAL EXAM:  Blood pressure 122/60, height 5' 5\" (1.651 m), weight 180 lb 9.6 oz (81.9 kg), last menstrual period 2023, not currently breastfeeding. OBGyn Exam   The patient appears well, alert, oriented x 3, in no distress. .    ASSESSMENT:  Encounter Diagnosis   Name Primary? Retained products of conception following  Yes       PLAN:  Discussed ultrasound findings with her. She has had a normal period since her last visit and it was very normal.  At this point she is due for her cycle. Discussed ultrasound findings could represent the appearance of her endometrium just prior to her menses. Discussed proceeding on with D&C versus observation and we ultrasounding in approximately 2 weeks at which point she should be just after her menses.   At this point hopefully we will see if thinner endometrium can better evaluate for any persistent abnormality. Rationale for the options were discussed with her and at this point she wishes to come back for repeat ultrasound in 2 weeks I think is very reasonable we will follow-up at that time. She then asked questions about her anxiety and depression. She is currently on Lamictal and 200 mg of Zoloft. She is asking whether she can be changed from her Zoloft. When asked who is treating her she states that she had a modified inpatient evaluation and treatment at [facility] but is currently not following with anybody. Discussed I do not think I am qualified to make adjustments to her medications due to the severity of her condition. She has no acute issues at this point and I discussed her following up with her primary care physician and she is interested in doing that. She states that she understands rationale. Orders Placed This Encounter   Procedures    AMB POC US, TRANSVAGINAL     Order Specific Question:   Are you Pregnant? Answer:    No

## 2023-03-14 ENCOUNTER — OFFICE VISIT (OUTPATIENT)
Dept: OBGYN CLINIC | Age: 33
End: 2023-03-14
Payer: MEDICAID

## 2023-03-14 VITALS
BODY MASS INDEX: 30.09 KG/M2 | WEIGHT: 180.6 LBS | DIASTOLIC BLOOD PRESSURE: 60 MMHG | SYSTOLIC BLOOD PRESSURE: 122 MMHG | HEIGHT: 65 IN

## 2023-03-14 DIAGNOSIS — F41.9 ANXIETY AND DEPRESSION: ICD-10-CM

## 2023-03-14 DIAGNOSIS — R93.89 ENDOMETRIAL THICKENING ON ULTRASOUND: ICD-10-CM

## 2023-03-14 DIAGNOSIS — O03.4 RETAINED PRODUCTS OF CONCEPTION FOLLOWING ABORTION: Primary | ICD-10-CM

## 2023-03-14 DIAGNOSIS — F32.A ANXIETY AND DEPRESSION: ICD-10-CM

## 2023-03-14 PROCEDURE — 99213 OFFICE O/P EST LOW 20 MIN: CPT | Performed by: OBSTETRICS & GYNECOLOGY

## 2023-03-14 PROCEDURE — 76830 TRANSVAGINAL US NON-OB: CPT | Performed by: OBSTETRICS & GYNECOLOGY

## 2023-03-14 RX ORDER — LAMOTRIGINE 25 MG/1
TABLET ORAL
COMMUNITY
Start: 2023-02-08

## 2023-03-25 ENCOUNTER — HOSPITAL ENCOUNTER (EMERGENCY)
Age: 33
Discharge: HOME | End: 2023-03-25
Attending: EMERGENCY MEDICINE
Payer: COMMERCIAL

## 2023-03-25 VITALS
HEIGHT: 65 IN | DIASTOLIC BLOOD PRESSURE: 78 MMHG | HEART RATE: 82 BPM | BODY MASS INDEX: 27.49 KG/M2 | WEIGHT: 165 LBS | SYSTOLIC BLOOD PRESSURE: 118 MMHG | OXYGEN SATURATION: 95 % | RESPIRATION RATE: 17 BRPM | TEMPERATURE: 98.5 F

## 2023-03-25 DIAGNOSIS — F34.1 PERSISTENT DEPRESSIVE DISORDER: Primary | ICD-10-CM

## 2023-03-25 PROCEDURE — 99283 EMERGENCY DEPT VISIT LOW MDM: CPT | Performed by: EMERGENCY MEDICINE

## 2023-03-25 RX ORDER — LAMOTRIGINE 25 MG/1
50 TABLET ORAL DAILY
Qty: 30 TABLET | Refills: 1 | Status: SHIPPED | OUTPATIENT
Start: 2023-03-25 | End: 2023-04-24

## 2023-03-25 ASSESSMENT — ENCOUNTER SYMPTOMS
VOMITING: 0
COUGH: 0
NAUSEA: 0
SHORTNESS OF BREATH: 0

## 2023-03-25 ASSESSMENT — PAIN - FUNCTIONAL ASSESSMENT: PAIN_FUNCTIONAL_ASSESSMENT: NONE - DENIES PAIN

## 2023-03-26 NOTE — ED TRIAGE NOTES
Pt made statements to father of wanting to kill herself d/t marital strain, was in [facility] in February, left voluntarily, was instructed by [facility] to come here for mental health assessment

## 2023-03-26 NOTE — ED PROVIDER NOTES
Emergency Department Provider Note                   PCP:                Samira Sanchez MD               Age: 29 y.o. Sex: female       ICD-10-CM    1. Persistent depressive disorder  F34.1           DISPOSITION Decision To Discharge 03/25/2023 11:19:50 PM       MDM  Number of Diagnoses or Management Options  Persistent depressive disorder: established, worsening  Diagnosis management comments: MEDICAL DECISION MAKING  Complexity of Problems Addressed:  1 or more chronic illnesses with a severe exacerbation or progression. Data Reviewed and Analyzed:  Category 1:   The patients assessment required an independent historian: The patient's father assists with history as noted in HPI. Category 3: Discussion of management or test interpretation. The patient presents with some worsening depression. Tele-psych consult requested. The patient was evaluated by psychiatrist. She felt the patient did not need inpatient treatment, but med adjustment suggested with increasing Lamotrigine to 50 mg. This prescription was sent to patient's pharmacy. She also felt the patient would benefit from outpatient resources, and patient is amenable to treatment options. Case management consult placed. Patient aware  not available today and that referral would be for Monday when they are available. The patient remained without SI/HI plan. Her father and patient were in agreement with treatment plan and disposition. Risk of Complications and/or Morbidity of Patient Management:  Prescription drug management performed, Discussion with external consultants, and Shared medical decision making was utilized in creating the patients health plan today.      Patient Progress  Patient progress: stable             Orders Placed This Encounter   Procedures    CBC with Auto Differential    BMP    TSH    Inpatient consult to Case Management        Medications - No data to display    New Prescriptions    LAMOTRIGINE [id number]

## 2023-03-27 NOTE — CARE COORDINATION
Consult left for REGINALDO in CM box requesting referral to outpatient psychiatry. Patient appears to already have a psychiatrist but has difficulty obtaining a sooner appointment. SW called the patient to clarify. Patient advises that she has an upcoming appointment. SW talked about being seen at [facility] for a sooner appointment if needed. Patient states that she's currently at [facility] being triaged for voluntary inpatient treatment vs. Intensive outpatient. Patient denies any needs from SW at this time. Patient urged to call REGINALDO for any further assistance.      Lynda Gómez, JAMEL    [facility]

## 2023-06-01 RX ORDER — SERTRALINE HYDROCHLORIDE 100 MG/1
200 TABLET, FILM COATED ORAL DAILY
Qty: 60 TABLET | Refills: 2 | OUTPATIENT
Start: 2023-06-01

## 2024-11-18 NOTE — PROGRESS NOTES
and dry.   Neurological:      General: No focal deficit present.      Mental Status: She is alert.   Psychiatric:         Mood and Affect: Mood normal.           Tests:  Results for orders placed or performed in visit on 11/19/24   AMB POC URINALYSIS DIP STICK MANUAL W/O MICRO   Result Value Ref Range    Color (UA POC) Yellow     Clarity (UA POC) Clear     Glucose, Urine, POC Negative     Bilirubin, Urine, POC Negative     Ketones, Urine, POC Negative     Specific Gravity, Urine, POC 1.015 1.001 - 1.035    Blood (UA POC) Negative     pH, Urine, POC 6.5 4.6 - 8.0    Protein, Urine, POC Negative     Urobilinogen, POC 0.2 mg/dL <1.1 mg/dL    Nitrite, Urine, POC Negative Negative    Leukocyte Esterase, Urine, POC Negative           Assessment/Plan  Kirsten was seen today for annual exam.    Diagnoses and all orders for this visit:    Well woman exam  -     AMB POC URINALYSIS DIP STICK MANUAL W/O MICRO  -     PAP IG, HPV Rfx HPV 16/18,45; Future  -     PAP IG, HPV Rfx HPV 16/18,45    Screening for genitourinary condition  -     AMB POC URINALYSIS DIP STICK MANUAL W/O MICRO    Screening for human papillomavirus (HPV)  -     PAP IG, HPV Rfx HPV 16/18,45; Future  -     PAP IG, HPV Rfx HPV 16/18,45    Screening for cervical cancer  -     PAP IG, HPV Rfx HPV 16/18,45; Future  -     PAP IG, HPV Rfx HPV 16/18,45    Other orders  -     Levonorgest-Eth Estrad 91-Day 0.15-0.03 &0.01 MG TABS; Take 1 tablet by mouth daily    Discussed contraceptive options and I think to help out with any PMDD type of concerns would need to do something hormonal.  Discussed this in detail with her including extended cycling with Seasonique see her back in approxi-6 months that she was gone through 2 cycles with that to see if it is improved.  She will let us know if she has any problems sooner    No follow-up provider specified.    Chaperone was utilized for pelvic exam       Portions of this note were created using voice recognition software.

## 2024-11-19 ENCOUNTER — OFFICE VISIT (OUTPATIENT)
Dept: OBGYN CLINIC | Age: 34
End: 2024-11-19
Payer: COMMERCIAL

## 2024-11-19 VITALS
WEIGHT: 222.3 LBS | BODY MASS INDEX: 37.04 KG/M2 | HEIGHT: 65 IN | DIASTOLIC BLOOD PRESSURE: 82 MMHG | SYSTOLIC BLOOD PRESSURE: 120 MMHG

## 2024-11-19 DIAGNOSIS — Z11.51 SCREENING FOR HUMAN PAPILLOMAVIRUS (HPV): ICD-10-CM

## 2024-11-19 DIAGNOSIS — Z01.419 WELL WOMAN EXAM: Primary | ICD-10-CM

## 2024-11-19 DIAGNOSIS — Z12.4 SCREENING FOR CERVICAL CANCER: ICD-10-CM

## 2024-11-19 DIAGNOSIS — Z13.89 SCREENING FOR GENITOURINARY CONDITION: ICD-10-CM

## 2024-11-19 LAB
BILIRUBIN, URINE, POC: NEGATIVE
BLOOD URINE, POC: NEGATIVE
GLUCOSE URINE, POC: NEGATIVE
KETONES, URINE, POC: NEGATIVE
LEUKOCYTE ESTERASE, URINE, POC: NEGATIVE
NITRITE, URINE, POC: NEGATIVE
PH, URINE, POC: 6.5 (ref 4.6–8)
PROTEIN,URINE, POC: NEGATIVE
SPECIFIC GRAVITY, URINE, POC: 1.01 (ref 1–1.03)
URINALYSIS CLARITY, POC: CLEAR
URINALYSIS COLOR, POC: YELLOW
UROBILINOGEN, POC: NORMAL MG/DL

## 2024-11-19 PROCEDURE — 99395 PREV VISIT EST AGE 18-39: CPT | Performed by: OBSTETRICS & GYNECOLOGY

## 2024-11-19 PROCEDURE — 99459 PELVIC EXAMINATION: CPT | Performed by: OBSTETRICS & GYNECOLOGY

## 2024-11-19 PROCEDURE — 81002 URINALYSIS NONAUTO W/O SCOPE: CPT | Performed by: OBSTETRICS & GYNECOLOGY

## 2024-11-19 RX ORDER — LITHIUM CARBONATE 450 MG
900 TABLET, EXTENDED RELEASE ORAL NIGHTLY
COMMUNITY
Start: 2024-10-31

## 2024-11-19 RX ORDER — LEVONORGESTREL / ETHINYL ESTRADIOL AND ETHINYL ESTRADIOL 150-30(84)
1 KIT ORAL DAILY
Qty: 1 PACKET | Refills: 3 | Status: SHIPPED | OUTPATIENT
Start: 2024-11-19

## 2024-11-19 RX ORDER — DESVENLAFAXINE 100 MG/1
100 TABLET, EXTENDED RELEASE ORAL DAILY
COMMUNITY
Start: 2024-11-06

## 2024-11-19 RX ORDER — CARIPRAZINE 3 MG/1
3 CAPSULE, GELATIN COATED ORAL DAILY
COMMUNITY
Start: 2024-10-10

## 2024-11-19 RX ORDER — LORAZEPAM 1 MG/1
1 TABLET ORAL EVERY 6 HOURS PRN
COMMUNITY
Start: 2024-11-05

## 2024-11-28 LAB
COLLECTION METHOD: ABNORMAL
CYTOLOGIST CVX/VAG CYTO: ABNORMAL
CYTOLOGY CVX/VAG DOC THIN PREP: ABNORMAL
HPV APTIMA: POSITIVE
HPV GENOTYPE 18,45: NEGATIVE
HPV, GENOTYPE 16: NEGATIVE
Lab: ABNORMAL
PAP SOURCE: ABNORMAL
PATH REPORT.FINAL DX SPEC: ABNORMAL
PATHOLOGIST CVX/VAG CYTO: ABNORMAL
PATHOLOGIST PROVIDED ICD: ABNORMAL
STAT OF ADQ CVX/VAG CYTO-IMP: ABNORMAL

## 2024-12-04 ENCOUNTER — TELEPHONE (OUTPATIENT)
Dept: OBGYN CLINIC | Age: 34
End: 2024-12-04

## 2024-12-04 NOTE — TELEPHONE ENCOUNTER
Spoke with the patient regarding abnormal pap smear results.   Dr. Palafox recommends a Colposcopy.   Patient is scheduled on 12/17/24. All questions and concerns were answered.   My chart message was also sent to the patient regarding what we discussed on the phone as requested by the patient. Patient wanted to discuss with family about pap smear results.

## 2024-12-16 NOTE — PROGRESS NOTES
Colposcopy Exam      Wants to discuss stopping OCP's. OCP's was prescribed on 2024 due to Psych being concerned about PMDD component and asked about contraception to help. Does not want any other birth control at this time.   Has discontinued depression/ anxiety medications.     Indication for Colposcopy:  2024 LGSIL. HPV Positive. Genotype 16, 18/45 Negative.     Contraceptive method:  OCP (estrogen/progesterone)    LMP:  Patient's last menstrual period was 2024 (exact date).  Tobacco Use:    Social History     Tobacco Use   Smoking Status Never   Smokeless Tobacco Never     Medications:    Current Outpatient Medications on File Prior to Visit   Medication Sig Dispense Refill    VRAYLAR 3 MG CAPS capsule Take 1 capsule by mouth daily      Levonorgest-Eth Estrad -Day 0.15-0.03 &0.01 MG TABS Take 1 tablet by mouth daily 1 packet 3    LORazepam (ATIVAN) 1 MG tablet Take 1 tablet by mouth every 6 hours as needed. (Patient not taking: Reported on 2024)      lithium (ESKALITH) 450 MG extended release tablet Take 2 tablets by mouth nightly (Patient not taking: Reported on 2024)      desvenlafaxine succinate (PRISTIQ) 100 MG TB24 extended release tablet Take 1 tablet by mouth daily (Patient not taking: Reported on 2024)       No current facility-administered medications on file prior to visit.     Allergies:    Allergies as of 2024 - Fully Reviewed 2024   Allergen Reaction Noted    Sulfa antibiotics Other (See Comments) 12/15/2016       [facility]  [facility]    Abnormal Pap and Colposcopy History:    12/15/2016 pap smear Negative.   2018 pap smear Negative.   2020 pap smear Negative.   02/15/2021 pap smear Negative.   2024 LGSIL. HPV Positive. Genotype 16, 18/45 Negative.     Procedure:  The patient is counseled regarding the risks of coloposcopy, including bleeding, infection at the biopsy site or endometrium, and failure to identify the lesion.  Patient

## 2024-12-17 ENCOUNTER — PROCEDURE VISIT (OUTPATIENT)
Dept: OBGYN CLINIC | Age: 34
End: 2024-12-17

## 2024-12-17 VITALS — HEIGHT: 65 IN | WEIGHT: 223.1 LBS | BODY MASS INDEX: 37.17 KG/M2

## 2024-12-17 DIAGNOSIS — Z01.812 PRE-PROCEDURE LAB EXAM: ICD-10-CM

## 2024-12-17 DIAGNOSIS — Z32.00 ENCOUNTER FOR PREGNANCY TEST, RESULT UNKNOWN: Primary | ICD-10-CM

## 2024-12-17 DIAGNOSIS — B97.7 HPV IN FEMALE: ICD-10-CM

## 2024-12-17 DIAGNOSIS — R87.612 LGSIL ON PAP SMEAR OF CERVIX: ICD-10-CM

## 2024-12-17 LAB
HCG, PREGNANCY, URINE, POC: NEGATIVE
VALID INTERNAL CONTROL, POC: YES

## (undated) DEVICE — REM POLYHESIVE ADULT PATIENT RETURN ELECTRODE: Brand: VALLEYLAB

## (undated) DEVICE — AMD ANTIMICROBIAL NON-ADHERENT PAD,0.2% POLYHEXAMETHYLENE BIGUANIDE HCI (PHMB): Brand: TELFA

## (undated) DEVICE — PREP SKN DURAPREP 26ML APPL --

## (undated) DEVICE — (D)PREP SKN CHLRAPRP APPL 26ML -- CONVERT TO ITEM 371833

## (undated) DEVICE — MEDI-VAC NON-CONDUCTIVE SUCTION TUBING: Brand: CARDINAL HEALTH

## (undated) DEVICE — STERILE POLYISOPRENE POWDER-FREE SURGICAL GLOVES: Brand: PROTEXIS

## (undated) DEVICE — AMD ANTIMICROBIAL GAUZE SPONGES,12 PLY USP TYPE VII, 0.2% POLYHEXAMETHYLENE BIGUANIDE HCI (PHMB): Brand: CURITY

## (undated) DEVICE — PENCIL ES L3M BTTN SWCH S STL HEX LOK BLDE ELECTRD HOLSTER

## (undated) DEVICE — CATH FOL TY IC BAG 16FR 2000ML -- CONVERT TO ITEM 363158

## (undated) DEVICE — SUTURE MCRYL SZ 3-0 L27IN ABSRB UD L60MM KS STR REV CUT Y523H

## (undated) DEVICE — SOLUTION IRRIG 1000ML H2O STRL BLT

## (undated) DEVICE — SUTURE VCRL SZ 0 L36IN ABSRB UD CTX-B 1/2 CIR BLNT PNT NDL JB978

## (undated) DEVICE — Device: Brand: PORTEX

## (undated) DEVICE — SURGICAL PROCEDURE PACK C SECT CDS

## (undated) DEVICE — SUTURE VCRL SZ 0 L36IN ABSRB UD L48MM CTX 1/2 CIR J978H

## (undated) DEVICE — SUTURE VCRL 3-0 L36IN ABSRB UD CTB-1 L36MM 1/2 CIR NDL JB944

## (undated) DEVICE — KENDALL SCD EXPRESS SLEEVES, KNEE LENGTH, MEDIUM: Brand: KENDALL SCD

## (undated) DEVICE — SUT CHRMC 0 27IN CT1 BRN --

## (undated) DEVICE — SOLUTION IV 1000ML 0.9% SOD CHL

## (undated) DEVICE — SUTURE PLN GUT SZ 2-0 L27IN ABSRB YELLOWISH TAN L40MM CT 853H

## (undated) DEVICE — SUTURE MCRYL SZ 4-0 L27IN ABSRB UD L19MM PS-2 1/2 CIR PRIM Y426H

## (undated) DEVICE — TRAY CATH 16FR PVC INTMIT STR TIP PREATTACH TO 1000ML COLL

## (undated) DEVICE — SUTURE VCRL SZ 3-0 L36IN ABSRB UD L36MM CT-1 1/2 CIR J944H